# Patient Record
Sex: MALE | Race: BLACK OR AFRICAN AMERICAN | NOT HISPANIC OR LATINO | URBAN - METROPOLITAN AREA
[De-identification: names, ages, dates, MRNs, and addresses within clinical notes are randomized per-mention and may not be internally consistent; named-entity substitution may affect disease eponyms.]

---

## 2017-07-02 ENCOUNTER — EMERGENCY (EMERGENCY)
Facility: HOSPITAL | Age: 27
LOS: 1 days | Discharge: HOME | End: 2017-07-02
Admitting: EMERGENCY MEDICINE
Payer: COMMERCIAL

## 2017-07-02 VITALS
HEART RATE: 98 BPM | OXYGEN SATURATION: 96 % | SYSTOLIC BLOOD PRESSURE: 139 MMHG | HEIGHT: 70 IN | DIASTOLIC BLOOD PRESSURE: 94 MMHG | RESPIRATION RATE: 18 BRPM | TEMPERATURE: 99 F | WEIGHT: 176.37 LBS

## 2017-07-02 VITALS
DIASTOLIC BLOOD PRESSURE: 75 MMHG | RESPIRATION RATE: 18 BRPM | HEART RATE: 87 BPM | OXYGEN SATURATION: 97 % | SYSTOLIC BLOOD PRESSURE: 123 MMHG | TEMPERATURE: 98 F

## 2017-07-02 DIAGNOSIS — M54.2 CERVICALGIA: ICD-10-CM

## 2017-07-02 DIAGNOSIS — M62.838 OTHER MUSCLE SPASM: ICD-10-CM

## 2017-07-02 DIAGNOSIS — Z79.899 OTHER LONG TERM (CURRENT) DRUG THERAPY: ICD-10-CM

## 2017-07-02 PROCEDURE — 99284 EMERGENCY DEPT VISIT MOD MDM: CPT | Mod: 25

## 2017-07-02 PROCEDURE — 96372 THER/PROPH/DIAG INJ SC/IM: CPT

## 2017-07-02 PROCEDURE — 99283 EMERGENCY DEPT VISIT LOW MDM: CPT | Mod: 25

## 2017-07-02 RX ORDER — KETOROLAC TROMETHAMINE 30 MG/ML
60 SYRINGE (ML) INJECTION ONCE
Qty: 0 | Refills: 0 | Status: DISCONTINUED | OUTPATIENT
Start: 2017-07-02 | End: 2017-07-02

## 2017-07-02 RX ORDER — IBUPROFEN 200 MG
1 TABLET ORAL
Qty: 20 | Refills: 0 | OUTPATIENT
Start: 2017-07-02

## 2017-07-02 RX ORDER — DIAZEPAM 5 MG
5 TABLET ORAL ONCE
Qty: 0 | Refills: 0 | Status: DISCONTINUED | OUTPATIENT
Start: 2017-07-02 | End: 2017-07-02

## 2017-07-02 RX ORDER — DIAZEPAM 5 MG
1 TABLET ORAL
Qty: 6 | Refills: 0 | OUTPATIENT
Start: 2017-07-02

## 2017-07-02 RX ADMIN — Medication 60 MILLIGRAM(S): at 10:18

## 2017-07-02 RX ADMIN — Medication 5 MILLIGRAM(S): at 08:17

## 2017-07-02 RX ADMIN — Medication 60 MILLIGRAM(S): at 08:18

## 2017-07-02 NOTE — ED ADULT NURSE NOTE - OBJECTIVE STATEMENT
Patient to the ED for worsening neck pain over the last 2 days where he had difficulty getting out of bed this morning and is presently unable to move his neck with radiation to L shoulder and back of L leg.  Patient reported that he has a history if sciatica and takes ibuprofen at home as needed.

## 2017-07-02 NOTE — ED PROVIDER NOTE - NS ED ROS FT
CONSTITUTIONAL: No fever, chills, or weakness  NEURO: No headache, no dizziness, no syncope; No weakness/tingling/numbness  EYES: No visual changes  ENT: No rhinorrhea or sore throat  PULM: No cough or dyspnea  CV: No chest pain or palpitations  GI: No abdominal pain, vomiting, or diarrhea  : No dysuria, hematuria, frequency  MSK: Hx of chronic back pain from sciatica, at baseline. No joint pain  SKIN: no rash

## 2017-07-02 NOTE — ED PROVIDER NOTE - OBJECTIVE STATEMENT
pt c/o pain and stiffness in the back of his neck for about 3 days.  has severe pain when he tries to move neck, especially from side to side. no fever or chills, no headache, no rash.  no nasal congestion or rhinorrhea, and no throat pain or pain in ears.  there has been no history of trauma or neck manipulation.

## 2017-07-02 NOTE — ED PROVIDER NOTE - MEDICAL DECISION MAKING DETAILS
cervical muscle spasm/torticollis.  no hx of trauma.  neuro exam intact, no cervical bruit.  tx warm compresses, nsaid, muscle relaxants. cervical muscle spasm/torticollis.  no hx of trauma.  no fever/headache/rash to suggest meningitis.  neuro exam intact, no cervical bruit.  tx warm compresses, nsaid, muscle relaxants.

## 2017-07-02 NOTE — ED PROVIDER NOTE - PHYSICAL EXAMINATION
CONSTITUTIONAL: WD,WN. NAD.    SKIN: Normal color and turgor. No rash.    EYES: Conjunctiva clear. EOMI. PERRL.    ENT: Airway patent, OP clear. Nasal mucosa clear, no rhinorrhea.   RESPIRATORY:  Breathing non-labored. No retractions, accessory muscle use.  Lungs CTA bilat.  CARDIOVASCULAR:  RRR, S1S2. No M/R/G.    No neck bruit.  GI:  Abdomen soft, nontender.    MSK: Initial lateral neck rotation limited to apx 10 deg left and right with muscular spasm noted.  NEURO: Alert and oriented; CN  II-XII intact.  BROOKS with normal strength.  DTR +2 bilateral biceps, BR, patella. Normal speech and gait.

## 2018-02-01 PROBLEM — Z00.00 ENCOUNTER FOR PREVENTIVE HEALTH EXAMINATION: Status: ACTIVE | Noted: 2018-02-01

## 2018-02-05 ENCOUNTER — APPOINTMENT (OUTPATIENT)
Dept: SURGERY | Facility: CLINIC | Age: 28
End: 2018-02-05

## 2018-04-02 ENCOUNTER — APPOINTMENT (OUTPATIENT)
Dept: SURGERY | Facility: CLINIC | Age: 28
End: 2018-04-02

## 2019-02-18 ENCOUNTER — EMERGENCY (EMERGENCY)
Facility: HOSPITAL | Age: 29
LOS: 1 days | Discharge: HOME | End: 2019-02-18
Attending: EMERGENCY MEDICINE | Admitting: EMERGENCY MEDICINE
Payer: COMMERCIAL

## 2019-02-18 VITALS
TEMPERATURE: 98 F | HEART RATE: 72 BPM | DIASTOLIC BLOOD PRESSURE: 82 MMHG | OXYGEN SATURATION: 98 % | RESPIRATION RATE: 20 BRPM | SYSTOLIC BLOOD PRESSURE: 135 MMHG

## 2019-02-18 VITALS
RESPIRATION RATE: 20 BRPM | OXYGEN SATURATION: 100 % | DIASTOLIC BLOOD PRESSURE: 99 MMHG | SYSTOLIC BLOOD PRESSURE: 149 MMHG | WEIGHT: 180.78 LBS | TEMPERATURE: 99 F | HEART RATE: 86 BPM

## 2019-02-18 DIAGNOSIS — R07.89 OTHER CHEST PAIN: ICD-10-CM

## 2019-02-18 DIAGNOSIS — Z79.1 LONG TERM (CURRENT) USE OF NON-STEROIDAL ANTI-INFLAMMATORIES (NSAID): ICD-10-CM

## 2019-02-18 DIAGNOSIS — Z79.899 OTHER LONG TERM (CURRENT) DRUG THERAPY: ICD-10-CM

## 2019-02-18 DIAGNOSIS — I10 ESSENTIAL (PRIMARY) HYPERTENSION: ICD-10-CM

## 2019-02-18 DIAGNOSIS — Z87.891 PERSONAL HISTORY OF NICOTINE DEPENDENCE: ICD-10-CM

## 2019-02-18 PROBLEM — M54.30 SCIATICA, UNSPECIFIED SIDE: Chronic | Status: ACTIVE | Noted: 2017-07-02

## 2019-02-18 LAB
ALBUMIN SERPL ELPH-MCNC: 5 G/DL — SIGNIFICANT CHANGE UP (ref 3.3–5)
ALP SERPL-CCNC: 99 U/L — SIGNIFICANT CHANGE UP (ref 40–120)
ALT FLD-CCNC: 35 U/L — SIGNIFICANT CHANGE UP (ref 10–45)
ANION GAP SERPL CALC-SCNC: 16 MMOL/L — SIGNIFICANT CHANGE UP (ref 5–17)
AST SERPL-CCNC: 42 U/L — HIGH (ref 10–40)
BASOPHILS # BLD AUTO: 0.05 K/UL — SIGNIFICANT CHANGE UP (ref 0–0.2)
BASOPHILS NFR BLD AUTO: 0.8 % — SIGNIFICANT CHANGE UP (ref 0–2)
BILIRUB SERPL-MCNC: 1.2 MG/DL — SIGNIFICANT CHANGE UP (ref 0.2–1.2)
BUN SERPL-MCNC: 9 MG/DL — SIGNIFICANT CHANGE UP (ref 7–23)
CALCIUM SERPL-MCNC: 10.4 MG/DL — SIGNIFICANT CHANGE UP (ref 8.4–10.5)
CHLORIDE SERPL-SCNC: 93 MMOL/L — LOW (ref 96–108)
CO2 SERPL-SCNC: 25 MMOL/L — SIGNIFICANT CHANGE UP (ref 22–31)
CREAT SERPL-MCNC: 0.88 MG/DL — SIGNIFICANT CHANGE UP (ref 0.5–1.3)
D DIMER BLD IA.RAPID-MCNC: <150 NG/ML DDU — SIGNIFICANT CHANGE UP
EOSINOPHIL # BLD AUTO: 0.16 K/UL — SIGNIFICANT CHANGE UP (ref 0–0.5)
EOSINOPHIL NFR BLD AUTO: 2.5 % — SIGNIFICANT CHANGE UP (ref 0–6)
GLUCOSE SERPL-MCNC: 112 MG/DL — HIGH (ref 70–99)
HCT VFR BLD CALC: 47.6 % — SIGNIFICANT CHANGE UP (ref 39–50)
HGB BLD-MCNC: 16.4 G/DL — SIGNIFICANT CHANGE UP (ref 13–17)
IMM GRANULOCYTES NFR BLD AUTO: 0.2 % — SIGNIFICANT CHANGE UP (ref 0–1.5)
LIDOCAIN IGE QN: 29 U/L — SIGNIFICANT CHANGE UP (ref 7–60)
LYMPHOCYTES # BLD AUTO: 2.03 K/UL — SIGNIFICANT CHANGE UP (ref 1–3.3)
LYMPHOCYTES # BLD AUTO: 31.4 % — SIGNIFICANT CHANGE UP (ref 13–44)
MAGNESIUM SERPL-MCNC: 2 MG/DL — SIGNIFICANT CHANGE UP (ref 1.6–2.6)
MCHC RBC-ENTMCNC: 30.3 PG — SIGNIFICANT CHANGE UP (ref 27–34)
MCHC RBC-ENTMCNC: 34.5 GM/DL — SIGNIFICANT CHANGE UP (ref 32–36)
MCV RBC AUTO: 87.8 FL — SIGNIFICANT CHANGE UP (ref 80–100)
MONOCYTES # BLD AUTO: 0.64 K/UL — SIGNIFICANT CHANGE UP (ref 0–0.9)
MONOCYTES NFR BLD AUTO: 9.9 % — SIGNIFICANT CHANGE UP (ref 2–14)
NEUTROPHILS # BLD AUTO: 3.58 K/UL — SIGNIFICANT CHANGE UP (ref 1.8–7.4)
NEUTROPHILS NFR BLD AUTO: 55.2 % — SIGNIFICANT CHANGE UP (ref 43–77)
NRBC # BLD: 0 /100 WBCS — SIGNIFICANT CHANGE UP (ref 0–0)
PLATELET # BLD AUTO: 268 K/UL — SIGNIFICANT CHANGE UP (ref 150–400)
POTASSIUM SERPL-MCNC: 3.9 MMOL/L — SIGNIFICANT CHANGE UP (ref 3.5–5.3)
POTASSIUM SERPL-SCNC: 3.9 MMOL/L — SIGNIFICANT CHANGE UP (ref 3.5–5.3)
PROT SERPL-MCNC: 9.1 G/DL — HIGH (ref 6–8.3)
RBC # BLD: 5.42 M/UL — SIGNIFICANT CHANGE UP (ref 4.2–5.8)
RBC # FLD: 11.9 % — SIGNIFICANT CHANGE UP (ref 10.3–14.5)
SODIUM SERPL-SCNC: 134 MMOL/L — LOW (ref 135–145)
TROPONIN T SERPL-MCNC: <0.01 NG/ML — SIGNIFICANT CHANGE UP (ref 0–0.01)
TROPONIN T SERPL-MCNC: <0.01 NG/ML — SIGNIFICANT CHANGE UP (ref 0–0.01)
WBC # BLD: 6.47 K/UL — SIGNIFICANT CHANGE UP (ref 3.8–10.5)
WBC # FLD AUTO: 6.47 K/UL — SIGNIFICANT CHANGE UP (ref 3.8–10.5)

## 2019-02-18 PROCEDURE — 85025 COMPLETE CBC W/AUTO DIFF WBC: CPT

## 2019-02-18 PROCEDURE — 36415 COLL VENOUS BLD VENIPUNCTURE: CPT

## 2019-02-18 PROCEDURE — 80053 COMPREHEN METABOLIC PANEL: CPT

## 2019-02-18 PROCEDURE — 99284 EMERGENCY DEPT VISIT MOD MDM: CPT | Mod: 25

## 2019-02-18 PROCEDURE — 83735 ASSAY OF MAGNESIUM: CPT

## 2019-02-18 PROCEDURE — 84484 ASSAY OF TROPONIN QUANT: CPT

## 2019-02-18 PROCEDURE — 93005 ELECTROCARDIOGRAM TRACING: CPT

## 2019-02-18 PROCEDURE — 85379 FIBRIN DEGRADATION QUANT: CPT

## 2019-02-18 PROCEDURE — 71046 X-RAY EXAM CHEST 2 VIEWS: CPT

## 2019-02-18 PROCEDURE — 96360 HYDRATION IV INFUSION INIT: CPT

## 2019-02-18 PROCEDURE — 71046 X-RAY EXAM CHEST 2 VIEWS: CPT | Mod: 26

## 2019-02-18 PROCEDURE — 94640 AIRWAY INHALATION TREATMENT: CPT

## 2019-02-18 PROCEDURE — 83690 ASSAY OF LIPASE: CPT

## 2019-02-18 PROCEDURE — 99285 EMERGENCY DEPT VISIT HI MDM: CPT | Mod: 25

## 2019-02-18 PROCEDURE — 93010 ELECTROCARDIOGRAM REPORT: CPT

## 2019-02-18 RX ORDER — ASPIRIN/CALCIUM CARB/MAGNESIUM 324 MG
325 TABLET ORAL ONCE
Qty: 0 | Refills: 0 | Status: COMPLETED | OUTPATIENT
Start: 2019-02-18 | End: 2019-02-18

## 2019-02-18 RX ORDER — SODIUM CHLORIDE 9 MG/ML
1000 INJECTION INTRAMUSCULAR; INTRAVENOUS; SUBCUTANEOUS ONCE
Qty: 0 | Refills: 0 | Status: COMPLETED | OUTPATIENT
Start: 2019-02-18 | End: 2019-02-18

## 2019-02-18 RX ORDER — ASPIRIN/CALCIUM CARB/MAGNESIUM 324 MG
325 TABLET ORAL DAILY
Qty: 0 | Refills: 0 | Status: DISCONTINUED | OUTPATIENT
Start: 2019-02-18 | End: 2019-02-18

## 2019-02-18 RX ORDER — ALPRAZOLAM 0.25 MG
0.25 TABLET ORAL ONCE
Qty: 0 | Refills: 0 | Status: DISCONTINUED | OUTPATIENT
Start: 2019-02-18 | End: 2019-02-18

## 2019-02-18 RX ORDER — IBUPROFEN 200 MG
1 TABLET ORAL
Qty: 0 | Refills: 0 | COMMUNITY

## 2019-02-18 RX ORDER — IPRATROPIUM/ALBUTEROL SULFATE 18-103MCG
3 AEROSOL WITH ADAPTER (GRAM) INHALATION ONCE
Qty: 0 | Refills: 0 | Status: COMPLETED | OUTPATIENT
Start: 2019-02-18 | End: 2019-02-18

## 2019-02-18 RX ORDER — AMLODIPINE BESYLATE 2.5 MG/1
5 TABLET ORAL ONCE
Qty: 0 | Refills: 0 | Status: COMPLETED | OUTPATIENT
Start: 2019-02-18 | End: 2019-02-18

## 2019-02-18 RX ADMIN — Medication 3 MILLILITER(S): at 11:01

## 2019-02-18 RX ADMIN — SODIUM CHLORIDE 2000 MILLILITER(S): 9 INJECTION INTRAMUSCULAR; INTRAVENOUS; SUBCUTANEOUS at 11:01

## 2019-02-18 RX ADMIN — SODIUM CHLORIDE 1000 MILLILITER(S): 9 INJECTION INTRAMUSCULAR; INTRAVENOUS; SUBCUTANEOUS at 12:01

## 2019-02-18 RX ADMIN — Medication 325 MILLIGRAM(S): at 11:01

## 2019-02-18 RX ADMIN — AMLODIPINE BESYLATE 5 MILLIGRAM(S): 2.5 TABLET ORAL at 11:01

## 2019-02-18 RX ADMIN — Medication 0.25 MILLIGRAM(S): at 12:51

## 2019-02-18 NOTE — ED ADULT NURSE NOTE - OBJECTIVE STATEMENT
27 y/o male w/ hx of HTN, on Amlodipine 2.5, presents to the ED c/o left sided chest pain/tightness that began at 4 AM associated w/ feeling of anxiety. Denies fever, chills, cough, SOB, abdominal pain, back pain, nausea, vomiting, and any other associated sx.

## 2019-02-18 NOTE — ED ADULT NURSE NOTE - OTHER COMPLAINTS
hx of HTN. reports left sided chest tightness since this morning. sent by city MD. family hx of heart disease

## 2019-02-18 NOTE — ED PROVIDER NOTE - DIAGNOSTIC INTERPRETATION
CXR wet read: The heart appears to be within normal limits in transverse diameter.  No acute infiltrates, effusions or evidence of pulmonary vascular congestion.  The chest wall and surrounding bony structures appear normal.

## 2019-02-18 NOTE — ED PROVIDER NOTE - OBJECTIVE STATEMENT
chest tightness since 4 am this morning  chest pain  Additional Complaints  hx of HTN. reports left sided chest tightness since this morning. sent by city MD. family hx of heart disease 29 yo M with PMH of sciatica (OTC pain meds prn pain), HTN, on Amlodipine, otherwise healthy "at 4 am I was about to go to sleep and I had a bad feeling come over me...like a bad anxiety attack and then I started to have chest tightness" Admits to some SOB at the onset of the episode, with no SOB currently. Chest tightness continued and is still present now (X 6 hours) and pt went to and Urgent Care center and was sent to ED for evaluation. No fevers, chills, cough. Pt states that his father  of a "cardiac arrest" at age 31 but ?asthma and resp arrest at etiology of his death as pt denies that he had any cardiac history. No other early FH of cardiac ds. States that he has had "anxiety attacks" in the past "years ago"- no chronic meds for anxiety, "but never had it this bad". Also states that he has been more stressed than usual for "personal reasons", but denies any SI/HI/ hallucinations. No illicit substance use.

## 2019-02-18 NOTE — ED PROVIDER NOTE - CARE PROVIDER_API CALL
Salma Pinto)  Internal Medicine  [address] [id number]  Phone: [phone]  Fax: [phone]  Follow Up Time:     Corbin Almodovar)  Medicine  [address] [place] [id number]  Phone: [phone]  Fax: [phone]  Follow Up Time:

## 2019-02-18 NOTE — ED PROVIDER NOTE - NSFOLLOWUPCLINICS_GEN_ALL_ED_FT
[facility] [address] [place] [address]  Family Medicine  [address] [place] [address], [facility]  Phone: [phone]  Fax: [phone]  Follow Up Time: 4-6 Days

## 2019-02-18 NOTE — ED PROVIDER NOTE - CLINICAL SUMMARY MEDICAL DECISION MAKING FREE TEXT BOX
29 yo M with PMH of sciatica (OTC pain meds prn pain), HTN, on Amlodipine, otherwise healthy "at 4 am I was about to go to sleep and I had a bad feeling come over me...like a bad anxiety attack and then I started to have chest tightness" Admits to some SOB at the onset of the episode, with no SOB currently. Chest tightness continued and is still present now (X 6 hours) and pt went to and Urgent Care center and was sent to ED for evaluation. No fevers, chills, cough. Pt with normal exam. EKG, CXR with NAD and trop is negative. Suspect sxs likely sec to anxiety. Pt reassured and to f/up outpt.

## 2019-02-18 NOTE — ED PROVIDER NOTE - CARE PROVIDERS DIRECT ADDRESSES
[email or web address]

## 2019-02-18 NOTE — ED ADULT TRIAGE NOTE - OTHER COMPLAINTS
hx of HTN. reports left sided chest tightness since this morning. sent by city MD hx of HTN. reports left sided chest tightness since this morning. sent by city MD. family hx of heart disease

## 2019-02-18 NOTE — ED ADULT NURSE NOTE - NSIMPLEMENTINTERV_GEN_ALL_ED
Implemented All Universal Safety Interventions:  [place] to call system. Call bell, personal items and telephone within reach. Instruct patient to call for assistance. Room bathroom lighting operational. Non-slip footwear when patient is off stretcher. Physically safe environment: no spills, clutter or unnecessary equipment. Stretcher in lowest position, wheels locked, appropriate side rails in place.

## 2019-02-27 ENCOUNTER — APPOINTMENT (OUTPATIENT)
Dept: HEART AND VASCULAR | Facility: CLINIC | Age: 29
End: 2019-02-27
Payer: MEDICAID

## 2019-02-27 ENCOUNTER — INBOUND DOCUMENT (OUTPATIENT)
Age: 29
End: 2019-02-27

## 2019-02-27 VITALS
WEIGHT: 178.99 LBS | TEMPERATURE: 98.9 F | OXYGEN SATURATION: 98 % | HEART RATE: 93 BPM | DIASTOLIC BLOOD PRESSURE: 80 MMHG | BODY MASS INDEX: 27.13 KG/M2 | SYSTOLIC BLOOD PRESSURE: 124 MMHG | HEIGHT: 68.11 IN

## 2019-02-27 DIAGNOSIS — Z82.49 FAMILY HISTORY OF ISCHEMIC HEART DISEASE AND OTHER DISEASES OF THE CIRCULATORY SYSTEM: ICD-10-CM

## 2019-02-27 DIAGNOSIS — Z87.891 PERSONAL HISTORY OF NICOTINE DEPENDENCE: ICD-10-CM

## 2019-02-27 DIAGNOSIS — Z78.9 OTHER SPECIFIED HEALTH STATUS: ICD-10-CM

## 2019-02-27 DIAGNOSIS — R07.9 CHEST PAIN, UNSPECIFIED: ICD-10-CM

## 2019-02-27 PROCEDURE — 99204 OFFICE O/P NEW MOD 45 MIN: CPT | Mod: 25

## 2019-02-27 PROCEDURE — 93000 ELECTROCARDIOGRAM COMPLETE: CPT

## 2019-02-27 PROCEDURE — 99401 PREV MED CNSL INDIV APPRX 15: CPT | Mod: 25

## 2019-02-27 RX ORDER — AZITHROMYCIN 500 MG/1
500 TABLET, FILM COATED ORAL
Qty: 2 | Refills: 0 | Status: COMPLETED | COMMUNITY
Start: 2019-01-10 | End: 2019-02-27

## 2019-02-27 RX ORDER — AMLODIPINE BESYLATE 2.5 MG/1
2.5 TABLET ORAL
Qty: 90 | Refills: 0 | Status: COMPLETED | COMMUNITY
Start: 2019-01-10 | End: 2019-02-27

## 2019-02-27 RX ORDER — HYDROXYZINE PAMOATE 50 MG/1
50 CAPSULE ORAL
Qty: 60 | Refills: 0 | Status: COMPLETED | COMMUNITY
Start: 2019-02-19 | End: 2019-02-27

## 2019-02-27 RX ORDER — AMLODIPINE BESYLATE 5 MG/1
5 TABLET ORAL
Qty: 90 | Refills: 0 | Status: ACTIVE | COMMUNITY
Start: 2019-02-19

## 2019-02-27 RX ORDER — AMOXICILLIN 500 MG/1
500 CAPSULE ORAL
Qty: 21 | Refills: 0 | Status: COMPLETED | COMMUNITY
Start: 2018-11-16 | End: 2019-02-27

## 2019-02-27 NOTE — HISTORY OF PRESENT ILLNESS
[FreeTextEntry1] : 28 AA M with HTN on norvasc 5mg with chonic lbp 2/2 sciatica, anxiety, asthma here for chest pain went to urgent care and er, with associated palp no syncope\par USOH, 100% compliant with meds\par location: chest\par duration: minutes\par  modifying factors rest\par timing: exertional\par severity: 6/10\par EKG nsr lvh\par \par fhx father SCD age 31\par \par sochx former smoker

## 2019-02-27 NOTE — PHYSICAL EXAM
[General Appearance - Well Developed] : well developed [Normal Appearance] : normal appearance [Well Groomed] : well groomed [General Appearance - Well Nourished] : well nourished [No Deformities] : no deformities [General Appearance - In No Acute Distress] : no acute distress [Normal Conjunctiva] : the conjunctiva exhibited no abnormalities [Eyelids - No Xanthelasma] : the eyelids demonstrated no xanthelasmas [Normal Oral Mucosa] : normal oral mucosa [No Oral Pallor] : no oral pallor [No Oral Cyanosis] : no oral cyanosis [Normal Jugular Venous A Waves Present] : normal jugular venous A waves present [Normal Jugular Venous V Waves Present] : normal jugular venous V waves present [No Jugular Venous Silverman A Waves] : no jugular venous silverman A waves [Heart Rate And Rhythm] : heart rate and rhythm were normal [Heart Sounds] : normal S1 and S2 [Murmurs] : no murmurs present [Respiration, Rhythm And Depth] : normal respiratory rhythm and effort [Exaggerated Use Of Accessory Muscles For Inspiration] : no accessory muscle use [Auscultation Breath Sounds / Voice Sounds] : lungs were clear to auscultation bilaterally [Abdomen Soft] : soft [Abdomen Tenderness] : non-tender [Abdomen Mass (___ Cm)] : no abdominal mass palpated [Abnormal Walk] : normal gait [Gait - Sufficient For Exercise Testing] : the gait was sufficient for exercise testing [Nail Clubbing] : no clubbing of the fingernails [Cyanosis, Localized] : no localized cyanosis [Petechial Hemorrhages (___cm)] : no petechial hemorrhages [Skin Color & Pigmentation] : normal skin color and pigmentation [] : no rash [No Venous Stasis] : no venous stasis [Skin Lesions] : no skin lesions [No Skin Ulcers] : no skin ulcer [No Xanthoma] : no  xanthoma was observed [Oriented To Time, Place, And Person] : oriented to person, place, and time [Affect] : the affect was normal [Mood] : the mood was normal [No Anxiety] : not feeling anxious

## 2019-02-27 NOTE — DISCUSSION/SUMMARY
[FreeTextEntry1] : The number of diagnostic and/or management options \par \par HTN - pain component versus essential htn - ambi bp monitor and 2d echo to eval central pressures and dd\par \par FHx CAD premature - father with scd, will get CTA cor to r/o anomolous cor arteries  given chest pain\par \par \par Labs, radiology: ekg\par \par Aspirin therapy: no\par \par LDL: n/a\par \par \par Overall Risk: High Complexity\par \par \par Additional detailed discussion regarding prevention including but not limiting to goal SBP<130mmHg, Mediterranean diet discussion, No salt diet, diabetes screening, and goal LDL<100. Smoking cessation, EtOH use and depression screen where applicable\par Exercise counseling and plan discussed with patient\par will readdress and reinforce prevention in subsequent visits

## 2019-03-17 ENCOUNTER — INPATIENT (INPATIENT)
Facility: HOSPITAL | Age: 29
LOS: 1 days | Discharge: HOME | DRG: 274 | End: 2019-03-19
Attending: INTERNAL MEDICINE | Admitting: INTERNAL MEDICINE
Payer: COMMERCIAL

## 2019-03-17 VITALS
TEMPERATURE: 98 F | SYSTOLIC BLOOD PRESSURE: 155 MMHG | DIASTOLIC BLOOD PRESSURE: 82 MMHG | RESPIRATION RATE: 20 BRPM | HEART RATE: 130 BPM

## 2019-03-17 LAB
ALBUMIN SERPL ELPH-MCNC: 4.8 G/DL — SIGNIFICANT CHANGE UP (ref 3.3–5)
ALP SERPL-CCNC: 96 U/L — SIGNIFICANT CHANGE UP (ref 40–120)
ALT FLD-CCNC: 34 U/L — SIGNIFICANT CHANGE UP (ref 10–45)
ANION GAP SERPL CALC-SCNC: 17 MMOL/L — SIGNIFICANT CHANGE UP (ref 5–17)
APTT BLD: 30.7 SEC — SIGNIFICANT CHANGE UP (ref 27.5–36.3)
AST SERPL-CCNC: 45 U/L — HIGH (ref 10–40)
BASOPHILS # BLD AUTO: 0.04 K/UL — SIGNIFICANT CHANGE UP (ref 0–0.2)
BASOPHILS NFR BLD AUTO: 0.5 % — SIGNIFICANT CHANGE UP (ref 0–2)
BILIRUB SERPL-MCNC: 0.8 MG/DL — SIGNIFICANT CHANGE UP (ref 0.2–1.2)
BUN SERPL-MCNC: 12 MG/DL — SIGNIFICANT CHANGE UP (ref 7–23)
CALCIUM SERPL-MCNC: 10.3 MG/DL — SIGNIFICANT CHANGE UP (ref 8.4–10.5)
CHLORIDE SERPL-SCNC: 99 MMOL/L — SIGNIFICANT CHANGE UP (ref 96–108)
CK MB CFR SERPL CALC: 1.6 NG/ML — SIGNIFICANT CHANGE UP (ref 0–6.7)
CO2 SERPL-SCNC: 21 MMOL/L — LOW (ref 22–31)
CREAT SERPL-MCNC: 0.88 MG/DL — SIGNIFICANT CHANGE UP (ref 0.5–1.3)
EOSINOPHIL # BLD AUTO: 0.04 K/UL — SIGNIFICANT CHANGE UP (ref 0–0.5)
EOSINOPHIL NFR BLD AUTO: 0.5 % — SIGNIFICANT CHANGE UP (ref 0–6)
GLUCOSE SERPL-MCNC: 130 MG/DL — HIGH (ref 70–99)
HCT VFR BLD CALC: 47.6 % — SIGNIFICANT CHANGE UP (ref 39–50)
HGB BLD-MCNC: 16.5 G/DL — SIGNIFICANT CHANGE UP (ref 13–17)
IMM GRANULOCYTES NFR BLD AUTO: 0.2 % — SIGNIFICANT CHANGE UP (ref 0–1.5)
INR BLD: 1.18 — HIGH (ref 0.88–1.16)
LYMPHOCYTES # BLD AUTO: 1.32 K/UL — SIGNIFICANT CHANGE UP (ref 1–3.3)
LYMPHOCYTES # BLD AUTO: 16.4 % — SIGNIFICANT CHANGE UP (ref 13–44)
MCHC RBC-ENTMCNC: 30.3 PG — SIGNIFICANT CHANGE UP (ref 27–34)
MCHC RBC-ENTMCNC: 34.7 GM/DL — SIGNIFICANT CHANGE UP (ref 32–36)
MCV RBC AUTO: 87.3 FL — SIGNIFICANT CHANGE UP (ref 80–100)
MONOCYTES # BLD AUTO: 0.51 K/UL — SIGNIFICANT CHANGE UP (ref 0–0.9)
MONOCYTES NFR BLD AUTO: 6.3 % — SIGNIFICANT CHANGE UP (ref 2–14)
NEUTROPHILS # BLD AUTO: 6.14 K/UL — SIGNIFICANT CHANGE UP (ref 1.8–7.4)
NEUTROPHILS NFR BLD AUTO: 76.1 % — SIGNIFICANT CHANGE UP (ref 43–77)
NRBC # BLD: 0 /100 WBCS — SIGNIFICANT CHANGE UP (ref 0–0)
PLATELET # BLD AUTO: 327 K/UL — SIGNIFICANT CHANGE UP (ref 150–400)
POTASSIUM SERPL-MCNC: 4.1 MMOL/L — SIGNIFICANT CHANGE UP (ref 3.5–5.3)
POTASSIUM SERPL-SCNC: 4.1 MMOL/L — SIGNIFICANT CHANGE UP (ref 3.5–5.3)
PROT SERPL-MCNC: 9.4 G/DL — HIGH (ref 6–8.3)
PROTHROM AB SERPL-ACNC: 13.4 SEC — HIGH (ref 10–12.9)
RBC # BLD: 5.45 M/UL — SIGNIFICANT CHANGE UP (ref 4.2–5.8)
RBC # FLD: 12 % — SIGNIFICANT CHANGE UP (ref 10.3–14.5)
SODIUM SERPL-SCNC: 137 MMOL/L — SIGNIFICANT CHANGE UP (ref 135–145)
TROPONIN T SERPL-MCNC: <0.01 NG/ML — SIGNIFICANT CHANGE UP (ref 0–0.01)
WBC # BLD: 8.07 K/UL — SIGNIFICANT CHANGE UP (ref 3.8–10.5)
WBC # FLD AUTO: 8.07 K/UL — SIGNIFICANT CHANGE UP (ref 3.8–10.5)

## 2019-03-17 PROCEDURE — 71046 X-RAY EXAM CHEST 2 VIEWS: CPT | Mod: 26

## 2019-03-17 PROCEDURE — 99291 CRITICAL CARE FIRST HOUR: CPT

## 2019-03-17 RX ORDER — ADENOSINE 3 MG/ML
6 INJECTION INTRAVENOUS ONCE
Qty: 0 | Refills: 0 | Status: COMPLETED | OUTPATIENT
Start: 2019-03-17 | End: 2019-03-17

## 2019-03-17 RX ORDER — SODIUM CHLORIDE 9 MG/ML
1000 INJECTION INTRAMUSCULAR; INTRAVENOUS; SUBCUTANEOUS ONCE
Qty: 0 | Refills: 0 | Status: COMPLETED | OUTPATIENT
Start: 2019-03-17 | End: 2019-03-17

## 2019-03-17 RX ADMIN — Medication 0.5 MILLIGRAM(S): at 21:25

## 2019-03-17 RX ADMIN — ADENOSINE 6 MILLIGRAM(S): 3 INJECTION INTRAVENOUS at 20:35

## 2019-03-17 RX ADMIN — Medication 1 MILLIGRAM(S): at 19:55

## 2019-03-17 RX ADMIN — SODIUM CHLORIDE 2000 MILLILITER(S): 9 INJECTION INTRAMUSCULAR; INTRAVENOUS; SUBCUTANEOUS at 19:55

## 2019-03-17 RX ADMIN — SODIUM CHLORIDE 2000 MILLILITER(S): 9 INJECTION INTRAMUSCULAR; INTRAVENOUS; SUBCUTANEOUS at 20:45

## 2019-03-17 NOTE — ED ADULT NURSE NOTE - OBJECTIVE STATEMENT
29 y/o male presents to ED with tachycardia and anxiety. AOx3, states began feeling chest tightness and "heart racing, feels SoB". Per mother, had severe anxiety exacerbation one month ago but today is worse. Notes feels like heart stopped, intermittent tingling in bilateral UE. Appears anxious in chair, denies abdominal pain, fevers, chills, dizziness, weakness. EKG done, peripheral IV placed, labs sent. 29 y/o male presents to ED with tachycardia and anxiety. AOx3, states began feeling chest tightness and "heart racing, feels SoB". Per mother, had severe anxiety exacerbation one month ago but today is worse. Hx HTN, anxiety, and sciatica. Notes feels like heart stopped, intermittent tingling in bilateral UE. Appears anxious in chair, denies abdominal pain, fevers, chills, dizziness, weakness. EKG done, peripheral IV placed, labs sent.

## 2019-03-17 NOTE — ED PROVIDER NOTE - ATTENDING CONTRIBUTION TO CARE
27 yo M with pmh of HTN, sciatica and ?anxiety c/o chest tightness and sob. Noted to be tachycardic to 130s, EKG done and reviewed, sinus tachycardia. pt placed on monitor, then went into HR of 200-210s, EKG w/ SVT. Both KIMBERLY Martinez and I evaluated pt at this time, attempted carotid massage, then modified valsalva without improvement. After adenosine 6mg, came back down to 130s, next EKG again in sinus tachycardia. Pt anxious appearing (appropriate for HR!), diaphoretic, tremulous. No peripheral edema on exam. case d/w cardiology fellow, to admit for further monitoring, workup, and management

## 2019-03-17 NOTE — ED PROVIDER NOTE - PROGRESS NOTE DETAILS
pt noted to be in SVT on monitor. Vagal maneuvers attempted with no improvement. Pt given 6mg adenosine IVP and rate dropped to 125-130 sinus tachy. Spoke with cards fellow, no need for oral meds at this time, will admit to cards

## 2019-03-17 NOTE — ED PROVIDER NOTE - PHYSICAL EXAMINATION
CONSTITUTIONAL: pt anxious appearing, shaking   HEAD: Normocephalic; atraumatic.   EYES: PERRL; EOM intact; conjunctiva and sclera clear  ENT: normal nose; no rhinorrhea; normal pharynx with no erythema or lesions.   NECK: Supple; non-tender; no LAD  CARDIOVASCULAR: Normal S1, S2; no murmurs, rubs, or gallops. tachycardic   RESPIRATORY: Breathing easily; breath sounds clear and equal bilaterally; no wheezes, rhonchi, or rales.  GI: Soft; non-distended; non-tender; no palpable organomegaly.   MSK: FROM at all extremities, normal tone   EXT: No cyanosis or edema; N/V intact  SKIN: Normal for age and race; warm; dry; good turgor; no apparent lesions or rash.   NEURO: A & O x 3; face symmetric; grossly unremarkable.   PSYCHOLOGICAL: The patient’s mood and manner are appropriate.

## 2019-03-17 NOTE — ED PROVIDER NOTE - DIAGNOSTIC INTERPRETATION
ER PA: Penny Martinez, PAC  CHEST XRAY INTERPRETATION: lungs clear, heart shadow normal, bony structures intact

## 2019-03-17 NOTE — ED PROVIDER NOTE - CLINICAL SUMMARY MEDICAL DECISION MAKING FREE TEXT BOX
27 yo M with pmh of HTN, sciatica and ?anxiety c/o chest tightness and sob. Recent neg w/u 1 month ago. Pt tachy at 130, afebrile. Anxious appearing. Cardio tachy. Lungs cta b/l. NO LE swelling. EKG sinus  tachy at 131. 29 yo M with pmh of HTN, sciatica and ?anxiety c/o chest tightness and sob. Recent neg w/u 1 month ago. Pt tachy at 130, afebrile. Anxious appearing. Cardio tachy. Lungs cta b/l. NO LE swelling. EKG sinus  tachy at 131. r/o infx r/o acs r/o dehydration less likely PE given no risk factors and recent neg ddimer. ?anxiety if all else ruled out

## 2019-03-17 NOTE — ED PROVIDER NOTE - OBJECTIVE STATEMENT
29 yo M with pmh of HTN, sciatica and ?anxiety c/o chest tightness and sob. Pt states he was watching tv about 1 hour ago when he started to feel tightness in his chest, sob and felt like his heart stopped. Pt states he felt like he was having a heart attack. Associated with tingling in hands and feet. Pt states he has been feeling anxious but has never had symptoms like this before. Pt seen in the ED in Feb for CP but at that time it was not as severe. Pt had normal labs including trop and ddimer at that time. Pt follow up with his pmd who gave him amlodipine for his BP and hydroxyzine as needed for anxiety. Pt denies fever, chills ,cough, palpitations, sweats, dizziness, n/v, recent travel, LE swelling. Pt quit smoking over 1 year ago. Denies FH of CAD or VTE. 29 yo M with pmh of HTN, sciatica and ?anxiety c/o chest tightness and sob. Pt states he was watching tv about 1 hour ago when he started to feel tightness in his chest, sob and felt like his heart stopped. Pt states he felt like he was having a heart attack. Associated with tingling in hands and feet. Pt states he has been feeling anxious but has never had symptoms like this before. Pt seen in the ED in Feb for CP but at that time it was not as severe. Pt had normal labs including trop and ddimer at that time. Pt follow up with his pmd who gave him amlodipine for his BP and hydroxyzine as needed for anxiety. Pt denies fever, chills ,cough, palpitations, sweats, dizziness, n/v, recent travel, LE swelling. Pt quit smoking over 1 year ago. Denies FH of CAD or VTE. Denies drug/ etoh use. Denies supplement use.

## 2019-03-17 NOTE — ED ADULT NURSE NOTE - NSIMPLEMENTINTERV_GEN_ALL_ED
Implemented All Universal Safety Interventions:  [place] to call system. Call bell, personal items and telephone within reach. Instruct patient to call for assistance. Room bathroom lighting operational. Non-slip footwear when patient is off stretcher. Physically safe environment: no spills, clutter or unnecessary equipment. Stretcher in lowest position, wheels locked, appropriate side rails in place.

## 2019-03-18 ENCOUNTER — TRANSCRIPTION ENCOUNTER (OUTPATIENT)
Age: 29
End: 2019-03-18

## 2019-03-18 DIAGNOSIS — I10 ESSENTIAL (PRIMARY) HYPERTENSION: ICD-10-CM

## 2019-03-18 DIAGNOSIS — I47.1 SUPRAVENTRICULAR TACHYCARDIA: ICD-10-CM

## 2019-03-18 LAB
ALBUMIN SERPL ELPH-MCNC: 4.1 G/DL — SIGNIFICANT CHANGE UP (ref 3.3–5)
ALP SERPL-CCNC: 81 U/L — SIGNIFICANT CHANGE UP (ref 40–120)
ALT FLD-CCNC: 26 U/L — SIGNIFICANT CHANGE UP (ref 10–45)
ANION GAP SERPL CALC-SCNC: 15 MMOL/L — SIGNIFICANT CHANGE UP (ref 5–17)
APTT BLD: 34 SEC — SIGNIFICANT CHANGE UP (ref 27.5–36.3)
AST SERPL-CCNC: 30 U/L — SIGNIFICANT CHANGE UP (ref 10–40)
BASOPHILS # BLD AUTO: 0.04 K/UL — SIGNIFICANT CHANGE UP (ref 0–0.2)
BASOPHILS NFR BLD AUTO: 0.6 % — SIGNIFICANT CHANGE UP (ref 0–2)
BILIRUB SERPL-MCNC: 1 MG/DL — SIGNIFICANT CHANGE UP (ref 0.2–1.2)
BUN SERPL-MCNC: 9 MG/DL — SIGNIFICANT CHANGE UP (ref 7–23)
CALCIUM SERPL-MCNC: 9.4 MG/DL — SIGNIFICANT CHANGE UP (ref 8.4–10.5)
CHLORIDE SERPL-SCNC: 102 MMOL/L — SIGNIFICANT CHANGE UP (ref 96–108)
CK MB CFR SERPL CALC: 1.9 NG/ML — SIGNIFICANT CHANGE UP (ref 0–6.7)
CK SERPL-CCNC: 246 U/L — HIGH (ref 30–200)
CO2 SERPL-SCNC: 23 MMOL/L — SIGNIFICANT CHANGE UP (ref 22–31)
CREAT SERPL-MCNC: 0.88 MG/DL — SIGNIFICANT CHANGE UP (ref 0.5–1.3)
CRP SERPL-MCNC: 1.09 MG/DL — HIGH (ref 0–0.4)
EOSINOPHIL # BLD AUTO: 0.16 K/UL — SIGNIFICANT CHANGE UP (ref 0–0.5)
EOSINOPHIL NFR BLD AUTO: 2.4 % — SIGNIFICANT CHANGE UP (ref 0–6)
ERYTHROCYTE [SEDIMENTATION RATE] IN BLOOD: 23 MM/HR — HIGH
GLUCOSE SERPL-MCNC: 79 MG/DL — SIGNIFICANT CHANGE UP (ref 70–99)
HCT VFR BLD CALC: 46.7 % — SIGNIFICANT CHANGE UP (ref 39–50)
HGB BLD-MCNC: 15.8 G/DL — SIGNIFICANT CHANGE UP (ref 13–17)
IMM GRANULOCYTES NFR BLD AUTO: 0 % — SIGNIFICANT CHANGE UP (ref 0–1.5)
INR BLD: 1.15 — SIGNIFICANT CHANGE UP (ref 0.88–1.16)
LYMPHOCYTES # BLD AUTO: 2.65 K/UL — SIGNIFICANT CHANGE UP (ref 1–3.3)
LYMPHOCYTES # BLD AUTO: 39.2 % — SIGNIFICANT CHANGE UP (ref 13–44)
MAGNESIUM SERPL-MCNC: 2 MG/DL — SIGNIFICANT CHANGE UP (ref 1.6–2.6)
MCHC RBC-ENTMCNC: 30.3 PG — SIGNIFICANT CHANGE UP (ref 27–34)
MCHC RBC-ENTMCNC: 33.8 GM/DL — SIGNIFICANT CHANGE UP (ref 32–36)
MCV RBC AUTO: 89.6 FL — SIGNIFICANT CHANGE UP (ref 80–100)
MONOCYTES # BLD AUTO: 0.57 K/UL — SIGNIFICANT CHANGE UP (ref 0–0.9)
MONOCYTES NFR BLD AUTO: 8.4 % — SIGNIFICANT CHANGE UP (ref 2–14)
NEUTROPHILS # BLD AUTO: 3.34 K/UL — SIGNIFICANT CHANGE UP (ref 1.8–7.4)
NEUTROPHILS NFR BLD AUTO: 49.4 % — SIGNIFICANT CHANGE UP (ref 43–77)
NRBC # BLD: 0 /100 WBCS — SIGNIFICANT CHANGE UP (ref 0–0)
NT-PROBNP SERPL-SCNC: 122 PG/ML — SIGNIFICANT CHANGE UP (ref 0–300)
PLATELET # BLD AUTO: 294 K/UL — SIGNIFICANT CHANGE UP (ref 150–400)
POTASSIUM SERPL-MCNC: 3.7 MMOL/L — SIGNIFICANT CHANGE UP (ref 3.5–5.3)
POTASSIUM SERPL-SCNC: 3.7 MMOL/L — SIGNIFICANT CHANGE UP (ref 3.5–5.3)
PROT SERPL-MCNC: 8 G/DL — SIGNIFICANT CHANGE UP (ref 6–8.3)
PROTHROM AB SERPL-ACNC: 13.1 SEC — HIGH (ref 10–12.9)
RBC # BLD: 5.21 M/UL — SIGNIFICANT CHANGE UP (ref 4.2–5.8)
RBC # FLD: 12.1 % — SIGNIFICANT CHANGE UP (ref 10.3–14.5)
SODIUM SERPL-SCNC: 140 MMOL/L — SIGNIFICANT CHANGE UP (ref 135–145)
TROPONIN T SERPL-MCNC: <0.01 NG/ML — SIGNIFICANT CHANGE UP (ref 0–0.01)
TSH SERPL-MCNC: 3.98 UIU/ML — SIGNIFICANT CHANGE UP (ref 0.35–4.94)
WBC # BLD: 6.76 K/UL — SIGNIFICANT CHANGE UP (ref 3.8–10.5)
WBC # FLD AUTO: 6.76 K/UL — SIGNIFICANT CHANGE UP (ref 3.8–10.5)

## 2019-03-18 PROCEDURE — 93653 COMPRE EP EVAL TX SVT: CPT

## 2019-03-18 PROCEDURE — 93613 INTRACARDIAC EPHYS 3D MAPG: CPT

## 2019-03-18 PROCEDURE — 99223 1ST HOSP IP/OBS HIGH 75: CPT

## 2019-03-18 PROCEDURE — 93010 ELECTROCARDIOGRAM REPORT: CPT

## 2019-03-18 PROCEDURE — 99220: CPT

## 2019-03-18 PROCEDURE — 93306 TTE W/DOPPLER COMPLETE: CPT | Mod: 26

## 2019-03-18 PROCEDURE — 93623 PRGRMD STIMJ&PACG IV RX NFS: CPT | Mod: 26

## 2019-03-18 PROCEDURE — 93621 COMP EP EVL L PAC&REC C SINS: CPT | Mod: 26

## 2019-03-18 RX ORDER — METOPROLOL TARTRATE 50 MG
12.5 TABLET ORAL
Qty: 0 | Refills: 0 | Status: DISCONTINUED | OUTPATIENT
Start: 2019-03-18 | End: 2019-03-18

## 2019-03-18 RX ORDER — HEPARIN SODIUM 5000 [USP'U]/ML
5000 INJECTION INTRAVENOUS; SUBCUTANEOUS EVERY 8 HOURS
Qty: 0 | Refills: 0 | Status: DISCONTINUED | OUTPATIENT
Start: 2019-03-18 | End: 2019-03-19

## 2019-03-18 RX ORDER — AMLODIPINE BESYLATE 2.5 MG/1
5 TABLET ORAL DAILY
Qty: 0 | Refills: 0 | Status: DISCONTINUED | OUTPATIENT
Start: 2019-03-18 | End: 2019-03-19

## 2019-03-18 RX ORDER — INFLUENZA VIRUS VACCINE 15; 15; 15; 15 UG/.5ML; UG/.5ML; UG/.5ML; UG/.5ML
0.5 SUSPENSION INTRAMUSCULAR ONCE
Qty: 0 | Refills: 0 | Status: COMPLETED | OUTPATIENT
Start: 2019-03-18 | End: 2019-03-18

## 2019-03-18 RX ORDER — METOPROLOL TARTRATE 50 MG
25 TABLET ORAL
Qty: 0 | Refills: 0 | Status: DISCONTINUED | OUTPATIENT
Start: 2019-03-18 | End: 2019-03-19

## 2019-03-18 RX ORDER — ACETAMINOPHEN 500 MG
650 TABLET ORAL ONCE
Qty: 0 | Refills: 0 | Status: COMPLETED | OUTPATIENT
Start: 2019-03-18 | End: 2019-03-18

## 2019-03-18 RX ORDER — AMLODIPINE BESYLATE 2.5 MG/1
1 TABLET ORAL
Qty: 0 | Refills: 0 | COMMUNITY

## 2019-03-18 RX ADMIN — Medication 650 MILLIGRAM(S): at 13:11

## 2019-03-18 RX ADMIN — Medication 650 MILLIGRAM(S): at 13:57

## 2019-03-18 RX ADMIN — AMLODIPINE BESYLATE 5 MILLIGRAM(S): 2.5 TABLET ORAL at 06:51

## 2019-03-18 RX ADMIN — Medication 25 MILLIGRAM(S): at 21:38

## 2019-03-18 RX ADMIN — Medication 12.5 MILLIGRAM(S): at 06:51

## 2019-03-18 RX ADMIN — HEPARIN SODIUM 5000 UNIT(S): 5000 INJECTION INTRAVENOUS; SUBCUTANEOUS at 21:38

## 2019-03-18 RX ADMIN — HEPARIN SODIUM 5000 UNIT(S): 5000 INJECTION INTRAVENOUS; SUBCUTANEOUS at 14:05

## 2019-03-18 RX ADMIN — HEPARIN SODIUM 5000 UNIT(S): 5000 INJECTION INTRAVENOUS; SUBCUTANEOUS at 06:59

## 2019-03-18 NOTE — H&P ADULT - NSICDXPROBLEM_GEN_ALL_CORE_FT
PROBLEM DIAGNOSES  Problem: AVNRT (AV zuleima re-entry tachycardia)  Assessment and Plan: EP consult in AM.  vs SVT.  Started Metoprolol 12.5mg PO bid

## 2019-03-18 NOTE — H&P ADULT - NSHPLABSRESULTS_GEN_ALL_CORE
16.5   8.07  )-----------( 327      ( 17 Mar 2019 20:05 )             47.6       03-17    137  |  99  |  12  ----------------------------<  130<H>  4.1   |  21<L>  |  0.88    Ca    10.3      17 Mar 2019 20:05    TPro  9.4<H>  /  Alb  4.8  /  TBili  0.8  /  DBili  x   /  AST  45<H>  /  ALT  34  /  AlkPhos  96  03-17      PT/INR - ( 17 Mar 2019 20:05 )   PT: 13.4 sec;   INR: 1.18          PTT - ( 17 Mar 2019 20:05 )  PTT:30.7 sec    CARDIAC MARKERS ( 17 Mar 2019 20:05 )  x     / <0.01 ng/mL / 192 U/L / x     / 1.6 ng/mL            EKG: SVT@125 bpm vs AVNRT

## 2019-03-18 NOTE — H&P ADULT - NSHPPHYSICALEXAM_GEN_ALL_CORE
T(C): 36.8 (03-18-19 @ 00:26), Max: 36.8 (03-18-19 @ 00:26)  HR: 118 (03-18-19 @ 00:58) (108 - 202)  BP: 130/91 (03-18-19 @ 00:58) (130/91 - 168/96)  RR: 18 (03-18-19 @ 00:58) (18 - 20)  SpO2: 100% (03-18-19 @ 00:58) (100% - 100%)  Wt(kg): --    Appearance: Normal	  HEENT:   Normal oral mucosa, PERRL, EOMI	  Neck: Supple, - JVD; No Carotid Bruit and 2+ pulses B/L  Cardiovascular:Sinus Tachy @114, No JVD, No murmurs  Respiratory: Lungs clear to auscultation/No Rales, Rhonchi, Wheezing	  Gastrointestinal:  Soft, Non-tender, + BS	  Skin: No rashes, No ecchymoses, No cyanosis  Extremities: Normal range of motion, No clubbing, cyanosis or edema  Vascular: Femoral pulses 2+ b/l without bruit, DP 2+ b/l, PT 1+ b/l  Neurologic: Non-focal  Psychiatry: A & O x 3, Mood & affect appropriate

## 2019-03-18 NOTE — H&P ADULT - NSHPREVIEWOFSYSTEMS_GEN_ALL_CORE
GENERAL, CONSTITUTIONAL : denies recent weight loss, fever, chills  EYES, VISION: denies changes in vision   EARS, NOSE, THROAT: denies hearing loss  HEART, CARDIOVASCULAR: admits to chest pain (tightness), palpitations denes arrhythmia,   RESPIRATORY: Admits to SOB Denies cough, , wheezing, PND, orthopnea  GASTROINTESTINAL: Denies abdominal pain, heartburn, bloody stool, dark tarry stool  GENITOURINARY: Denies frequent urination, urgency  MUSCULOSKELETAL denies joint pain or swelling, restricted motion, musculoskeletal pain.   SKIN & INTEGUMENTARY Denies rashes, sores, blisters, blisters, growths.  NEUROLOGICAL: Denies numbness or tingling sensations, sensation loss, burning.   PSYCHIATRIC: Denies nervousness, anxiety, depression  ENDOCRINE Denies heat or cold intolerance, excessive thirst  HEMATOLOGIC/LYMPHATIC: Denies abnormal bleeding, bleeding of any kind GENERAL, CONSTITUTIONAL : denies recent weight loss, fever, chills  EYES, VISION: denies changes in vision   EARS, NOSE, THROAT: denies hearing loss  HEART, CARDIOVASCULAR: admits to chest pain (tightness), palpitations denes arrhythmia,   RESPIRATORY: Admits to SOB Denies cough, , wheezing, PND, orthopnea  GASTROINTESTINAL: Denies abdominal pain, heartburn, bloody stool, dark tarry stool  GENITOURINARY: Denies frequent urination, urgency  MUSCULOSKELETAL denies joint pain or swelling, restricted motion, musculoskeletal pain.   SKIN & INTEGUMENTARY Denies rashes, sores, blisters, blisters, growths.  NEUROLOGICAL: admit to  numbness or tingling down right arm  denies sensations, sensation loss, burning.   PSYCHIATRIC: Admits to Anxiety Denies depression  ENDOCRINE Denies heat or cold intolerance, excessive thirst  HEMATOLOGIC/LYMPHATIC: Denies abnormal bleeding, bleeding of any kind

## 2019-03-18 NOTE — DISCHARGE NOTE PROVIDER - CARE PROVIDERS DIRECT ADDRESSES
[email or web address]

## 2019-03-18 NOTE — DISCHARGE NOTE PROVIDER - NSDCCPCAREPLAN_GEN_ALL_CORE_FT
PRINCIPAL DISCHARGE DIAGNOSIS  Diagnosis: SVT (supraventricular tachycardia)  Assessment and Plan of Treatment: You came to the hospital with chest discomfort and were found to be in an abnormal, fast heart rhythm. You were given a medication called adenosine which broke the abnormal rhythm. You were evaluated by electrophysiology and diagnosed with  atrioventricular zuleima re-entry tachycardia and underwent an ablation. Your cardiac enzymes (show if the heart is under stress) were negative. Your echocardiogram showed...      SECONDARY DISCHARGE DIAGNOSES  Diagnosis: Hypertension  Assessment and Plan of Treatment: PRINCIPAL DISCHARGE DIAGNOSIS  Diagnosis: SVT (supraventricular tachycardia)  Assessment and Plan of Treatment: You came to the hospital with chest discomfort and were found to be in an abnormal, fast heart rhythm. You were given a medication called adenosine which broke the abnormal rhythm. You were evaluated by electrophysiology and diagnosed with  atrioventricular zuleima re-entry tachycardia and underwent an ablation. Your cardiac enzymes (show if the heart is under stress) were negative. Your echocardiogram showed good heart function. Follow up with Dr. Lloyd in 1-2 weeks. Follow up with Dr. Mclaughlin on 5/1 at 1PM.      SECONDARY DISCHARGE DIAGNOSES  Diagnosis: Hypertension  Assessment and Plan of Treatment: Please continue medications as listed to keep your blood pressure controlled. For blood pressure that is too high or too low please see your doctor or go to the emergency room as necessary. PRINCIPAL DISCHARGE DIAGNOSIS  Diagnosis: SVT (supraventricular tachycardia)  Assessment and Plan of Treatment: You came to the hospital with chest discomfort and were found to be in an abnormal, fast heart rhythm. You were given a medication called adenosine which broke the abnormal rhythm. You were evaluated by electrophysiology and diagnosed with  atrioventricular zuleima re-entry tachycardia and underwent an ablation. Your cardiac enzymes (show if the heart is under stress) were negative. Your echocardiogram showed good heart function. Follow up with Dr. Lloyd in 1-2 weeks. Follow up with Dr. Mclaughlin on 5/1 at 1PM. You can take ibuprophen as needed for chest pain.      SECONDARY DISCHARGE DIAGNOSES  Diagnosis: Hypertension  Assessment and Plan of Treatment: Please continue medications as listed to keep your blood pressure controlled. For blood pressure that is too high or too low please see your doctor or go to the emergency room as necessary.

## 2019-03-18 NOTE — CONSULT NOTE ADULT - ASSESSMENT
29 y/o male former smoker h/o HTN, Anxiety and Sciatica BIBA to [facility] ER this evening, 3/17/19, complaining of sudden onset of chest tightness associated with right arm tingling with hand numbness and SOB. Found to have SVT @205bpm terminated with adenosine x 1.  He has been ruled out ACS with negative Cardiac enzyme x 4. Negative d-dimer.  No history of syncope / near-syncope.    - EKG in SVT is suggestive of short RP' interval, therefore suggestive of AVNRT. Treatment options were explained to him, which include medication and/or ablation.  He states that he is afraid to have recurrent episode again and is giving ablation a serious thought.  Risks of the procedure were explained to him including but not limited to bleeding, vascular damage, heart attack, stroke, and need for PPM.  He would like to talk to his mother first.   - Echo today please. 27 y/o male former smoker h/o HTN, Anxiety and Sciatica BIBA to [facility] ER this evening, 3/17/19, complaining of sudden onset of chest tightness associated with right arm tingling with hand numbness and SOB. Found to have SVT @205bpm terminated with adenosine x 1.  He has been ruled out ACS with negative Cardiac enzyme x 4. Negative d-dimer.  No history of syncope / near-syncope.    - EKG in SVT is suggestive of short RP' interval, therefore suggestive of AVNRT. Treatment options were explained to him, which include medication and/or ablation.  He states that he is afraid to have recurrent episode again and is giving ablation a serious thought.  Risks of the procedure were explained to him including but not limited to bleeding, vascular damage, heart attack, stroke, and need for PPM.  He would like to talk to his mother first.  Both the patient and his mother are agreeable to have procedure. We will plan for later case today. Continue NPO.   - Echo today please.

## 2019-03-18 NOTE — H&P ADULT - ASSESSMENT
29 y/o male former smoker with PMHx of HTN, Anxiety and Sciatica BIBA to [facility] ER this evening, 3/171/19, complaining of intermittent midsternal chest tightness associated with right arm tingling with hand numbness and SOB.    On arrival pt. noted to be in SVT @205bpm and was given Adenosine 6mg IV push revealing Sinus Tachy @125bpm vs AVNRT.

## 2019-03-18 NOTE — DISCHARGE NOTE PROVIDER - HOSPITAL COURSE
29yo M, former smoker, with PMHx of HTN, Anxiety and Sciatica BIBA to [facility] ED 3/17/19 complaining of CP and SOB. On arrival, patient was noted to be in AVNRT @ 198bpm and adenosine 6mg IV was pushed with resolution to SR. Pt states he had similar symptoms before in past in for which his PMD dx him with anxiety. Additionally, he was diagnosed with HTN about 5yrs ago and takes amlodipine 5mg QD. Trops were negative x 3. Echo revealed...        Patient was evaluated by EP who recommended AVNRT ablation. Patient is now s/p ablation with good result. He was seen and examined this AM and is asymptomatic without complaints. VSS, labs and tele reviewed. Patient is stable for discharge per Dr. Lloyd. Patient has received appropriate discharge instructions including medication regimen, access site management and follow up. 27yo M, former smoker, with PMHx of HTN, Anxiety and Sciatica BIBA to [facility] ED 3/17/19 complaining of CP and SOB. On arrival, patient was noted to be in AVNRT @ 198bpm and adenosine 6mg IV was pushed with resolution to SR. Pt states he had similar symptoms before in past in for which his PMD dx him with anxiety. Additionally, he was diagnosed with HTN about 5yrs ago and takes amlodipine 5mg QD. Trops were negative x 3. Echo revealed normal LV with EF 60%, trace MR, trace TR, no pericardial effusion. Patient was evaluated by EP who recommended AVNRT ablation. Patient underwent AVNRT ablation which was complicated by possible aflutter which was also ablated. He will continue metoprolol 25mg BID and follow up with EP as an outpatient. He was seen and examined this AM and is asymptomatic without complaints. VSS, labs and tele reviewed. Patient is stable for discharge per Dr. Lloyd. Patient has received appropriate discharge instructions including medication regimen, access site management and follow up.        Temp 98.6F, HR 78bpm, /62, RR 18, O2 sat 98% RA    Gen: NAD, A&O x 3    Cards: RRR, clear S1 and S2 without murmur    Pulm: CTA b/l without w/r/r    Abd: BS x 4, soft, NT    B/L groin: No hematoma or ooze, femoral pulse 2+ b/l without bruit, DP/PT 2+ b/l    Ext: No LE edema or ulcerations b/l

## 2019-03-18 NOTE — CONSULT NOTE ADULT - SUBJECTIVE AND OBJECTIVE BOX
Electrophysiology Consult Note:     CHIEF COMPLAINT:  Patient is a 28y old  Male who presents with a chief complaint of Intermittent, midsternal chest tightness associated with palpitations and SOB. (18 Mar 2019 01:44)        HISTORY OF PRESENT ILLNESS:   29 y/o male former smoker with PMHx of HTN, Anxiety and Sciatica BIBA to [facility] ER this evening, 3/17/19, complaining of sudden onset of chest tightness associated with right arm tingling with hand numbness and SOB. This happened while pt was watching TV.  On arrival pt, noted to be in SVT @205bpm and was given Adenosine 6mg IV push which terminated the tachycardia.  He states that today he still feels a little sore in the chest area.  This is the first episode that happened in such intensity. In the past, he c/o "anxiety episodes" that resolved on their own. No history of syncope / near-syncope.    He has been ruled out ACS with negative Cardiac enzyme x 4. Negative d-dimer.   FHx significant for father  at age 31 with "cardiac arrest".       PAST MEDICAL & SURGICAL HISTORY:  HTN (hypertension)  Sciatica  No significant past surgical history      FAMILY HISTORY:  Father  at age 31; was obese and had lung issue, but then was told to have  of "cardiac arrest".   Family hx of hypertension in father and mother's side      SOCIAL HISTORY:    Smokes tobacco occasionally  Social drinker 2 x per week (few beers)  Denies illicit drug use   In between jobs now - used to work in a [employer]       Allergies:  No Known Allergies  	    MEDICATIONS  (STANDING):  amLODIPine   Tablet 5 milliGRAM(s) Oral daily  heparin  Injectable 5000 Unit(s) SubCutaneous every 8 hours  metoprolol tartrate 12.5 milliGRAM(s) Oral two times a day        REVIEW OF SYSTEMS:  CONSTITUTIONAL: No fever, weight loss, or fatigue  EYES: No eye pain, visual disturbances, or discharge  ENMT:  No difficulty hearing, tinnitus, vertigo; No sinus or throat pain  NECK: No pain or stiffness  BREASTS: No pain, masses, or nipple discharge  RESPIRATORY: No cough, wheezing, chills or hemoptysis; No Shortness of Breath  CARDIOVASCULAR: See HPI.   GASTROINTESTINAL: No abdominal or epigastric pain. No nausea, vomiting, or hematemesis; No diarrhea or constipation. No melena or hematochezia.  GENITOURINARY: No dysuria, frequency, hematuria, or incontinence  NEUROLOGICAL: No headaches, memory loss, loss of strength, numbness, or tremors  SKIN: No itching, burning, rashes, or lesions   LYMPH Nodes: No enlarged glands  ENDOCRINE: No heat or cold intolerance; No hair loss  MUSCULOSKELETAL: c/o sciatic pain sometimes. Denies other joint pain or swelling   PSYCHIATRIC: No depression, anxiety, mood swings, or difficulty sleeping  HEME/LYMPH: No easy bruising, or bleeding gums  ALLERY AND IMMUNOLOGIC: No hives or eczema	      PHYSICAL EXAM:  Vital Signs Last 24 Hrs  T(C): 36.2 (18 Mar 2019 10:16), Max: 36.8 (18 Mar 2019 00:26)  T(F): 97.2 (18 Mar 2019 10:16), Max: 98.2 (18 Mar 2019 00:26)  HR: 84 (18 Mar 2019 08:40) (84 - 202)  BP: 131/86 (18 Mar 2019 08:40) (130/91 - 168/96)  RR: 16 (18 Mar 2019 08:40) (16 - 20)  SpO2: 97% (18 Mar 2019 08:40) (97% - 100%)  Daily Height in cm: 177.8 (18 Mar 2019 00:26)    Daily Weight in k.1 (18 Mar 2019 00:58)    Constitutional: NAD	  HEENT:   Normal oral mucosa, PERRL, EOMI	  NECK: No JVD,  CVS: Normal S1 S2, RRR,  No murmurs  Pulm: Lungs clear to auscultation	  GI:  + BS, Soft, NT/ND   Ext: No LE edema  Vascular: Peripheral pulses palpable 2+ bilaterally  MS: full range of motion in all joints  Neurologic: A&O x 3, Non-focal  Psych: Pleasant, has good insight  Skin: No rash or lesion       	  LABS:	                         15.8   6.76  )-----------( 294      ( 18 Mar 2019 07:08 )             46.7     03-18    140  |  102  |  9   ----------------------------<  79  3.7   |  23  |  0.88    Ca    9.4      18 Mar 2019 07:08  Mg     2.0     03-18    TPro  8.0  /  Alb  4.1  /  TBili  1.0  /  DBili  x   /  AST  30  /  ALT  26  /  AlkPhos  81      proBNP: Serum Pro-Brain Natriuretic Peptide: 122 pg/mL ( @ 07:08)    Lipid Profile:     TSH: Thyroid Stimulating Hormone, Serum: 3.981 uIU/mL ( @ 07:08)  Thyroid Stimulating Hormone, Serum: 1.046 uIU/mL ( @ 20:05)  	      EKG: 3/17/19: Narrow complex tachycardia  bpm.  Possibly short RP' noted in inferior leads and V1  EKG: 3/17/19 (post Adenosine): sinus tachy 125 bpm. Normal intervals.  . QRS 74. Qtc 430.  TWI in lead III.     Telemetry: NSR HR 80s     Echo: Pending Electrophysiology Consult Note:     CHIEF COMPLAINT:  Patient is a 28y old  Male who presents with a chief complaint of Intermittent, midsternal chest tightness associated with palpitations and SOB. (18 Mar 2019 01:44)        HISTORY OF PRESENT ILLNESS:   27 y/o male former smoker with PMHx of HTN, Anxiety and Sciatica BIBA to [facility] ER this evening, 3/17/19, complaining of sudden onset of chest tightness associated with right arm tingling with hand numbness and SOB. This happened while pt was watching TV.  On arrival pt, noted to be in SVT @205bpm and was given Adenosine 6mg IV push which terminated the tachycardia.  He states that today he still feels a little sore in the chest area.  This is the first episode that happened in such intensity. In the past, he c/o "anxiety episodes" that resolved on their own. No history of syncope / near-syncope.    He has been ruled out ACS with negative Cardiac enzyme x 4. Negative d-dimer.       PAST MEDICAL & SURGICAL HISTORY:  HTN (hypertension)  Sciatica  No significant past surgical history      FAMILY HISTORY:  Father  at age 31; was obese and had COPD,  in the hospital of "cardiac arrest". Autopsy was indeterminate as per pt's mother.   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       Allergies:  No Known Allergies  	    MEDICATIONS  (STANDING):  amLODIPine   Tablet 5 milliGRAM(s) Oral daily  heparin  Injectable 5000 Unit(s) SubCutaneous every 8 hours  metoprolol tartrate 12.5 milliGRAM(s) Oral two times a day        REVIEW OF SYSTEMS:  CONSTITUTIONAL: No fever, weight loss, or fatigue  EYES: No eye pain, visual disturbances, or discharge  ENMT:  No difficulty hearing, tinnitus, vertigo; No sinus or throat pain  NECK: No pain or stiffness  BREASTS: No pain, masses, or nipple discharge  RESPIRATORY: No cough, wheezing, chills or hemoptysis; No Shortness of Breath  CARDIOVASCULAR: See HPI.   GASTROINTESTINAL: No abdominal or epigastric pain. No nausea, vomiting, or hematemesis; No diarrhea or constipation. No melena or hematochezia.  GENITOURINARY: No dysuria, frequency, hematuria, or incontinence  NEUROLOGICAL: No headaches, memory loss, loss of strength, numbness, or tremors  SKIN: No itching, burning, rashes, or lesions   LYMPH Nodes: No enlarged glands  ENDOCRINE: No heat or cold intolerance; No hair loss  MUSCULOSKELETAL: c/o sciatic pain sometimes. Denies other joint pain or swelling   PSYCHIATRIC: No depression, anxiety, mood swings, or difficulty sleeping  HEME/LYMPH: No easy bruising, or bleeding gums  ALLERY AND IMMUNOLOGIC: No hives or eczema	      PHYSICAL EXAM:  Vital Signs Last 24 Hrs  T(C): 36.2 (18 Mar 2019 10:16), Max: 36.8 (18 Mar 2019 00:26)  T(F): 97.2 (18 Mar 2019 10:16), Max: 98.2 (18 Mar 2019 00:26)  HR: 84 (18 Mar 2019 08:40) (84 - 202)  BP: 131/86 (18 Mar 2019 08:40) (130/91 - 168/96)  RR: 16 (18 Mar 2019 08:40) (16 - 20)  SpO2: 97% (18 Mar 2019 08:40) (97% - 100%)  Daily Height in cm: 177.8 (18 Mar 2019 00:26)    Daily Weight in k.1 (18 Mar 2019 00:58)    Constitutional: NAD	  HEENT:   Normal oral mucosa, PERRL, EOMI	  NECK: No JVD,  CVS: Normal S1 S2, RRR,  No murmurs  Pulm: Lungs clear to auscultation	  GI:  + BS, Soft, NT/ND   Ext: No LE edema  Vascular: Peripheral pulses palpable 2+ bilaterally  MS: full range of motion in all joints  Neurologic: A&O x 3, Non-focal  Psych: Pleasant, has good insight  Skin: No rash or lesion       	  LABS:	                         15.8   6.76  )-----------( 294      ( 18 Mar 2019 07:08 )             46.7     03-18    140  |  102  |  9   ----------------------------<  79  3.7   |  23  |  0.88    Ca    9.4      18 Mar 2019 07:08  Mg     2.0     03-18    TPro  8.0  /  Alb  4.1  /  TBili  1.0  /  DBili  x   /  AST  30  /  ALT  26  /  AlkPhos  81  03-18    proBNP: Serum Pro-Brain Natriuretic Peptide: 122 pg/mL ( @ 07:08)    Lipid Profile:     TSH: Thyroid Stimulating Hormone, Serum: 3.981 uIU/mL ( @ 07:08)  Thyroid Stimulating Hormone, Serum: 1.046 uIU/mL ( @ 20:05)  	      EKG: 3/17/19: Narrow complex tachycardia  bpm.  Possibly short RP' noted in inferior leads and V1  EKG: 3/17/19 (post Adenosine): sinus tachy 125 bpm. Normal intervals.  . QRS 74. Qtc 430.  TWI in lead III.     Telemetry: NSR HR 80s     Echo: Pending

## 2019-03-18 NOTE — H&P ADULT - HISTORY OF PRESENT ILLNESS
27 y/o male former smoker with PMHx of HTN, Anxiety and Sciatica BIBA to [facility] ER this evening, 3/171/19, complaining of intermittent midsternal chest tightness associated with right arm tingling with hand numbness and SOB.    On arrival pt. noted to be in SVT @205bpm and was given Adenosine 6mg IV push revealing Sinus Tachy @125bpm vs AVNRT.       According to patient, he was sitting down watching television when suddenly he felt a band of tightness across his chest with right arm and hand numbness tingling  associated with SOB. Pt. states he assumed he was having a heart attack and called EMS.  Pt. endorses   having similar symptoms before in past in which PCP assumed he had anxiety and placed pt. on a antianxiety agent. (pt. does not recall which one).  Pt. reports recently being diagnosed with HTN and is currently on Amlodpine 5mg PO daily.     In ER on telemetry pt. noted to be in SVT @205bpm adenosine 6mg IV pushed with out portable ECG running, CCU Fellow made aware after the Adenosine push.  After Adenosine ECG reveals AVNRT vs Sinus Tach @ 125bpm, Troponin neg, CXR clear with no acute pathology, /70 27 y/o male former smoker with PMHx of HTN, Anxiety and Sciatica BIBA to [facility] ER this evening, 3/171/19, complaining of intermittent midsternal chest tightness associated with right arm tingling with hand numbness and SOB.    On arrival pt. noted to be in SVT @205bpm and was given Adenosine 6mg IV push revealing Sinus Tachy @125bpm vs AVNRT.       According to patient, he was sitting down watching television when suddenly he felt a band of tightness across his chest with right arm and hand numbness tingling  associated with SOB. Pt. states he assumed he was having a heart attack and called EMS.  Pt. endorses   having similar symptoms before in past in which PCP assumed he had anxiety and placed pt. on a antianxiety agent. (pt. does not recall which one).  Pt. reports recently being diagnosed with HTN and is currently on Amlodpine 5mg PO daily.     In ER on telemetry pt. noted to be in SVT @205bpm adenosine 6mg IV pushed with out portable ECG running, CCU Fellow made aware after the Adenosine push.  After Adenosine ECG reveals AVNRT vs Sinus Tach @ 125bpm, Troponin neg X3, D-dimer neg, TSH normal range, Blood Glucose 130, CXR clear with no acute pathology, /70.  Pt. admitted to [facility] for telemetry, ECG, serial CE, Echocardiogram, EP consult possible AVNRT and NPO for further tests.  F/U blood work, Utox.  Maybe workup for Pheochromocytoma?

## 2019-03-18 NOTE — PROGRESS NOTE ADULT - NSICDXPROBLEM_GEN_ALL_CORE_FT
PROBLEM DIAGNOSES  Problem: AVNRT (AV zuleima re-entry tachycardia)  Assessment and Plan: Currently in SR with HR 80s-90s  - continue metoprolol 12.5mg BID with plan to switch to XL on discharge  - NPO for ablation with EP today  - JULIA proctor pleuritic vs musclar vs anxiety related, trops neg x 3  - follow up echo    Problem: HTN (hypertension)  Assessment and Plan: SBP 130s-140s  - continue amlodipine 5mg QD and metoprolol 12.5mg BID  - plan for further HTN workup as an outpatient  DVT PPx: SCDs, ambulatory  DISPO: Pending ablation today

## 2019-03-18 NOTE — PROGRESS NOTE ADULT - ASSESSMENT
29yo M, former smoker, with PMHx of HTN, Anxiety and Sciatica who was BIBA to [facility] ED 3/17 complaining of chest tightness and SOB found to be in AVNRT @ 205bpm converted to SR with adenosine 6mg IV x 1. NPO for ablation today.

## 2019-03-18 NOTE — CHART NOTE - NSCHARTNOTEFT_GEN_A_CORE
PA called to bedside for pt complaining of chest pain. Upon arrival to bedside, patient resting comfortably in bed, VSS. Patient endorsing mild chest tightness worsened with inspiration and palpation. No events noted on tele. Given tylenol 650mg PO x 1 and EKG to be performed. Will continue to closely monitor.

## 2019-03-18 NOTE — DISCHARGE NOTE PROVIDER - CARE PROVIDER_API CALL
Albertina Lloyd)  Cardiology; Internal Medicine  [address] [address]  Phone: [phone]  Fax: [phone]  Follow Up Time:     Konstantin Mclaughlin)  Cardiac Electrophysiology; Cardiology; Cardiovascular Disease  [address], [address] [place] [address]  Phone: [phone]  Fax: [phone]  Follow Up Time:

## 2019-03-18 NOTE — PROGRESS NOTE ADULT - SUBJECTIVE AND OBJECTIVE BOX
Interventional Cardiology PA Adult Progress Note    CC: "I am still having some mild chest discomfort"    Subjective Assessment: Patient was seen and examined this AM and reported continued mild chest discomfort worse with inspiration and palpation. Given tylenol with improvement.    ROS otherwise negative except per subjective  	  MEDICATIONS:  amLODIPine   Tablet 5 milliGRAM(s) Oral daily  metoprolol tartrate 12.5 milliGRAM(s) Oral two times a day  heparin  Injectable 5000 Unit(s) SubCutaneous every 8 hours  	    [PHYSICAL EXAM:  TELEMETRY:  T(C): 36.2 (03-18-19 @ 10:16), Max: 36.8 (03-18-19 @ 00:26)  HR: 74 (03-18-19 @ 12:31) (74 - 202)  BP: 147/73 (03-18-19 @ 12:31) (130/91 - 168/96)  RR: 18 (03-18-19 @ 12:31) (16 - 20)  SpO2: 98% (03-18-19 @ 12:31) (97% - 100%)  Wt(kg): --  I&O's Summary    18 Mar 2019 07:01  -  18 Mar 2019 13:32  --------------------------------------------------------  IN: 0 mL / OUT: 500 mL / NET: -500 mL      Height (cm): 177.8 (03-18 @ 00:26)  Weight (kg): 76.1 (03-18 @ 00:26)  BMI (kg/m2): 24.1 (03-18 @ 00:26)  BSA (m2): 1.94 (03-18 @ 00:26)                                       Appearance: Normal	  HEENT:   Normal oral mucosa, PERRL, EOMI	  Neck: Supple, - JVD; No Carotid Bruits b/l  Cardiovascular: Normal S1 S2, No JVD, No murmurs  Respiratory: Lungs clear to auscultation, No Rales, Rhonchi, Wheezing	  Gastrointestinal:  Soft, Non-tender, + BS	  Skin: No rashes, No ecchymoses, No cyanosis  Extremities: Normal range of motion, No clubbing, cyanosis or edema  Vascular: Peripheral pulses palpable 2+ bilaterally  Neurologic: Non-focal  Psychiatry: A & O x 3, Mood & affect appropriate  	    LABS:	 	  CARDIAC MARKERS:                            15.8   6.76  )-----------( 294      ( 18 Mar 2019 07:08 )             46.7     03-18    140  |  102  |  9   ----------------------------<  79  3.7   |  23  |  0.88    Ca    9.4      18 Mar 2019 07:08  Mg     2.0     03-18    TPro  8.0  /  Alb  4.1  /  TBili  1.0  /  DBili  x   /  AST  30  /  ALT  26  /  AlkPhos  81  03-18    proBNP: Serum Pro-Brain Natriuretic Peptide: 122 pg/mL (03-18 @ 07:08)    TSH: Thyroid Stimulating Hormone, Serum: 3.981 uIU/mL (03-18 @ 07:08)  Thyroid Stimulating Hormone, Serum: 1.046 uIU/mL (03-17 @ 20:05)    PT/INR - ( 18 Mar 2019 07:24 )   PT: 13.1 sec;   INR: 1.15          PTT - ( 18 Mar 2019 07:24 )  PTT:34.0 sec

## 2019-03-19 ENCOUNTER — TRANSCRIPTION ENCOUNTER (OUTPATIENT)
Age: 29
End: 2019-03-19

## 2019-03-19 VITALS — TEMPERATURE: 98 F

## 2019-03-19 LAB
ANION GAP SERPL CALC-SCNC: 14 MMOL/L — SIGNIFICANT CHANGE UP (ref 5–17)
BUN SERPL-MCNC: 7 MG/DL — SIGNIFICANT CHANGE UP (ref 7–23)
CALCIUM SERPL-MCNC: 9.3 MG/DL — SIGNIFICANT CHANGE UP (ref 8.4–10.5)
CHLORIDE SERPL-SCNC: 99 MMOL/L — SIGNIFICANT CHANGE UP (ref 96–108)
CO2 SERPL-SCNC: 24 MMOL/L — SIGNIFICANT CHANGE UP (ref 22–31)
CREAT SERPL-MCNC: 0.93 MG/DL — SIGNIFICANT CHANGE UP (ref 0.5–1.3)
GLUCOSE SERPL-MCNC: 97 MG/DL — SIGNIFICANT CHANGE UP (ref 70–99)
HCT VFR BLD CALC: 46.1 % — SIGNIFICANT CHANGE UP (ref 39–50)
HGB BLD-MCNC: 15.6 G/DL — SIGNIFICANT CHANGE UP (ref 13–17)
MAGNESIUM SERPL-MCNC: 1.9 MG/DL — SIGNIFICANT CHANGE UP (ref 1.6–2.6)
MCHC RBC-ENTMCNC: 30.6 PG — SIGNIFICANT CHANGE UP (ref 27–34)
MCHC RBC-ENTMCNC: 33.8 GM/DL — SIGNIFICANT CHANGE UP (ref 32–36)
MCV RBC AUTO: 90.6 FL — SIGNIFICANT CHANGE UP (ref 80–100)
NRBC # BLD: 0 /100 WBCS — SIGNIFICANT CHANGE UP (ref 0–0)
PLATELET # BLD AUTO: 278 K/UL — SIGNIFICANT CHANGE UP (ref 150–400)
POTASSIUM SERPL-MCNC: 3.6 MMOL/L — SIGNIFICANT CHANGE UP (ref 3.5–5.3)
POTASSIUM SERPL-SCNC: 3.6 MMOL/L — SIGNIFICANT CHANGE UP (ref 3.5–5.3)
RBC # BLD: 5.09 M/UL — SIGNIFICANT CHANGE UP (ref 4.2–5.8)
RBC # FLD: 11.9 % — SIGNIFICANT CHANGE UP (ref 10.3–14.5)
SODIUM SERPL-SCNC: 137 MMOL/L — SIGNIFICANT CHANGE UP (ref 135–145)
WBC # BLD: 5.82 K/UL — SIGNIFICANT CHANGE UP (ref 3.8–10.5)
WBC # FLD AUTO: 5.82 K/UL — SIGNIFICANT CHANGE UP (ref 3.8–10.5)

## 2019-03-19 PROCEDURE — 99232 SBSQ HOSP IP/OBS MODERATE 35: CPT

## 2019-03-19 PROCEDURE — 99217: CPT

## 2019-03-19 PROCEDURE — 93010 ELECTROCARDIOGRAM REPORT: CPT

## 2019-03-19 RX ORDER — METOPROLOL TARTRATE 50 MG
1 TABLET ORAL
Qty: 60 | Refills: 1 | OUTPATIENT
Start: 2019-03-19 | End: 2019-05-17

## 2019-03-19 RX ORDER — POTASSIUM CHLORIDE 20 MEQ
40 PACKET (EA) ORAL ONCE
Qty: 0 | Refills: 0 | Status: COMPLETED | OUTPATIENT
Start: 2019-03-19 | End: 2019-03-19

## 2019-03-19 RX ORDER — MAGNESIUM OXIDE 400 MG ORAL TABLET 241.3 MG
400 TABLET ORAL ONCE
Qty: 0 | Refills: 0 | Status: COMPLETED | OUTPATIENT
Start: 2019-03-19 | End: 2019-03-19

## 2019-03-19 RX ORDER — ZOLPIDEM TARTRATE 10 MG/1
5 TABLET ORAL ONCE
Qty: 0 | Refills: 0 | Status: DISCONTINUED | OUTPATIENT
Start: 2019-03-19 | End: 2019-03-19

## 2019-03-19 RX ORDER — ACETAMINOPHEN 500 MG
650 TABLET ORAL ONCE
Qty: 0 | Refills: 0 | Status: COMPLETED | OUTPATIENT
Start: 2019-03-19 | End: 2019-03-19

## 2019-03-19 RX ADMIN — Medication 650 MILLIGRAM(S): at 13:56

## 2019-03-19 RX ADMIN — Medication 40 MILLIEQUIVALENT(S): at 12:14

## 2019-03-19 RX ADMIN — HEPARIN SODIUM 5000 UNIT(S): 5000 INJECTION INTRAVENOUS; SUBCUTANEOUS at 13:23

## 2019-03-19 RX ADMIN — Medication 650 MILLIGRAM(S): at 13:21

## 2019-03-19 RX ADMIN — ZOLPIDEM TARTRATE 5 MILLIGRAM(S): 10 TABLET ORAL at 01:05

## 2019-03-19 RX ADMIN — HEPARIN SODIUM 5000 UNIT(S): 5000 INJECTION INTRAVENOUS; SUBCUTANEOUS at 05:40

## 2019-03-19 RX ADMIN — AMLODIPINE BESYLATE 5 MILLIGRAM(S): 2.5 TABLET ORAL at 12:14

## 2019-03-19 RX ADMIN — MAGNESIUM OXIDE 400 MG ORAL TABLET 400 MILLIGRAM(S): 241.3 TABLET ORAL at 12:14

## 2019-03-19 RX ADMIN — Medication 25 MILLIGRAM(S): at 12:13

## 2019-03-19 NOTE — DISCHARGE NOTE NURSING/CASE MANAGEMENT/SOCIAL WORK - NSDCDPATPORTLINK_GEN_ALL_CORE
You can access the [employer] Patient Portal, offered by [facility] [place], by registering with the following website: [email or web address]

## 2019-03-19 NOTE — PROGRESS NOTE ADULT - ASSESSMENT
27 y/o M with h/o anxiety, HTN, p/w palpitations with EKG noted SVT up to 200 bpm. EKG was suggestive of AVNRT (short RP'). Echo was normal with normal EF and no valve disease. He underwent EPS and ablation of AVNRT yesterday.  However, EPS also suggested presence of other atrial arrhythmia ie AT.   - Recommend Toprol 25 mg daily for now.  If he has palpitations, he should give us a call.  Otherwise, he can f/u with DR. Mclaughlin on 5/1/19 at 1 PM here at [facility]. 29 y/o M with h/o anxiety, HTN, p/w palpitations with EKG noted SVT up to 200 bpm. EKG was suggestive of AVNRT (short RP'). Echo was normal with normal EF and no valve disease. He underwent EPS and ablation of AVNRT yesterday.  However, EPS also suggested presence of other atrial arrhythmia ie AT.   - Recommend Toprol 25 mg daily for now.  If he has palpitations, he should give us a call.  Otherwise, he can f/u with DR. Mclaughlin on 5/1/19 at 1 PM here at [facility].    sf

## 2019-03-19 NOTE — PROGRESS NOTE ADULT - SUBJECTIVE AND OBJECTIVE BOX
EPS Progress Note  CC: SVT     S: feels ok today. s/p EPS / ablation yesterday.  hasnt' been ambulating yet. No pain at the groin wound    O: T(C): 36.6 (03-19-19 @ 09:08), Max: 37 (03-19-19 @ 01:00)  HR: 73 (03-19-19 @ 08:15) (58 - 94)  BP: 134/95 (03-19-19 @ 08:15) (100/59 - 146/93)  RR: 16 (03-19-19 @ 08:15) (16 - 18)  SpO2: 98% (03-19-19 @ 08:15) (96% - 98%)    TELE: NSR HR 60.  50s when asleep     PHYSICAL  Constitutional:  NAD        Neck: No JVD  Pulm:  CTA B/L, no wheeze or rale   Cardiac:   + s1/s2, RRR, no murmur   GI:  +BS , soft ND/NT  Vascular: No LE edema, pulse 2+. Right groin wound stable without hematoma.   Neuro: AAO x 3. no focal deficit  Skin: Warm. No rash or lesion     LABS:                        15.6   5.82  )-----------( 278      ( 19 Mar 2019 05:37 )             46.1     03-19    137  |  99  |  7   ----------------------------<  97  3.6   |  24  |  0.93    Ca    9.3      19 Mar 2019 05:37  Mg     1.9     03-19    TPro  8.0  /  Alb  4.1  /  TBili  1.0  /  DBili  x   /  AST  30  /  ALT  26  /  AlkPhos  81  03-18    PT/INR - ( 18 Mar 2019 07:24 )   PT: 13.1 sec;   INR: 1.15          PTT - ( 18 Mar 2019 07:24 )  PTT:34.0 sec    MEDICATIONS:  amLODIPine   Tablet 5 milliGRAM(s) Oral daily  heparin  Injectable 5000 Unit(s) SubCutaneous every 8 hours  metoprolol tartrate 25 milliGRAM(s) Oral two times a day

## 2019-03-22 DIAGNOSIS — M54.30 SCIATICA, UNSPECIFIED SIDE: ICD-10-CM

## 2019-03-22 DIAGNOSIS — I47.1 SUPRAVENTRICULAR TACHYCARDIA: ICD-10-CM

## 2019-03-22 DIAGNOSIS — Z87.891 PERSONAL HISTORY OF NICOTINE DEPENDENCE: ICD-10-CM

## 2019-03-22 DIAGNOSIS — F41.9 ANXIETY DISORDER, UNSPECIFIED: ICD-10-CM

## 2019-03-22 DIAGNOSIS — I10 ESSENTIAL (PRIMARY) HYPERTENSION: ICD-10-CM

## 2019-03-29 ENCOUNTER — APPOINTMENT (OUTPATIENT)
Dept: HEART AND VASCULAR | Facility: CLINIC | Age: 29
End: 2019-03-29
Payer: MEDICAID

## 2019-03-29 VITALS
TEMPERATURE: 98.7 F | HEIGHT: 68.11 IN | BODY MASS INDEX: 25.76 KG/M2 | OXYGEN SATURATION: 100 % | SYSTOLIC BLOOD PRESSURE: 118 MMHG | WEIGHT: 170 LBS | HEART RATE: 57 BPM | DIASTOLIC BLOOD PRESSURE: 75 MMHG

## 2019-03-29 PROCEDURE — 93000 ELECTROCARDIOGRAM COMPLETE: CPT

## 2019-03-29 PROCEDURE — 99214 OFFICE O/P EST MOD 30 MIN: CPT

## 2019-03-29 RX ORDER — METOPROLOL TARTRATE 25 MG/1
25 TABLET, FILM COATED ORAL TWICE DAILY
Refills: 0 | Status: DISCONTINUED | COMMUNITY
End: 2019-03-29

## 2019-03-29 NOTE — PHYSICAL EXAM
[General Appearance - Well Developed] : well developed [Normal Appearance] : normal appearance [Well Groomed] : well groomed [General Appearance - Well Nourished] : well nourished [No Deformities] : no deformities [General Appearance - In No Acute Distress] : no acute distress [Normal Conjunctiva] : the conjunctiva exhibited no abnormalities [Eyelids - No Xanthelasma] : the eyelids demonstrated no xanthelasmas [Normal Oral Mucosa] : normal oral mucosa [No Oral Pallor] : no oral pallor [No Oral Cyanosis] : no oral cyanosis [Normal Jugular Venous A Waves Present] : normal jugular venous A waves present [Normal Jugular Venous V Waves Present] : normal jugular venous V waves present [No Jugular Venous Silverman A Waves] : no jugular venous silverman A waves [Respiration, Rhythm And Depth] : normal respiratory rhythm and effort [Exaggerated Use Of Accessory Muscles For Inspiration] : no accessory muscle use [Auscultation Breath Sounds / Voice Sounds] : lungs were clear to auscultation bilaterally [Heart Rate And Rhythm] : heart rate and rhythm were normal [Heart Sounds] : normal S1 and S2 [Murmurs] : no murmurs present [Abdomen Soft] : soft [Abdomen Tenderness] : non-tender [Abdomen Mass (___ Cm)] : no abdominal mass palpated [Abnormal Walk] : normal gait [Gait - Sufficient For Exercise Testing] : the gait was sufficient for exercise testing [Nail Clubbing] : no clubbing of the fingernails [Cyanosis, Localized] : no localized cyanosis [Petechial Hemorrhages (___cm)] : no petechial hemorrhages [Skin Color & Pigmentation] : normal skin color and pigmentation [] : no rash [No Venous Stasis] : no venous stasis [Skin Lesions] : no skin lesions [No Skin Ulcers] : no skin ulcer [No Xanthoma] : no  xanthoma was observed [Oriented To Time, Place, And Person] : oriented to person, place, and time [Affect] : the affect was normal [Mood] : the mood was normal [No Anxiety] : not feeling anxious

## 2019-03-29 NOTE — ASSESSMENT
[FreeTextEntry1] : avnrt metoprolol for three months then can stop\par htn controlled on amlodipine has a strong FH of htn no need for secondary HTN evaluation he is well controlled on one agent

## 2019-03-29 NOTE — HISTORY OF PRESENT ILLNESS
[FreeTextEntry1] : 28 M HTN AVNRT s/p ablation here for follow up feels ok no complaints no further palpitations\par \par \par ecg sinus bradycardia

## 2019-04-04 PROCEDURE — 85027 COMPLETE CBC AUTOMATED: CPT

## 2019-04-04 PROCEDURE — 85652 RBC SED RATE AUTOMATED: CPT

## 2019-04-04 PROCEDURE — 80053 COMPREHEN METABOLIC PANEL: CPT

## 2019-04-04 PROCEDURE — 85025 COMPLETE CBC W/AUTO DIFF WBC: CPT

## 2019-04-04 PROCEDURE — 83735 ASSAY OF MAGNESIUM: CPT

## 2019-04-04 PROCEDURE — 82553 CREATINE MB FRACTION: CPT

## 2019-04-04 PROCEDURE — C1894: CPT

## 2019-04-04 PROCEDURE — 80048 BASIC METABOLIC PNL TOTAL CA: CPT

## 2019-04-04 PROCEDURE — 83880 ASSAY OF NATRIURETIC PEPTIDE: CPT

## 2019-04-04 PROCEDURE — 93306 TTE W/DOPPLER COMPLETE: CPT

## 2019-04-04 PROCEDURE — 36415 COLL VENOUS BLD VENIPUNCTURE: CPT

## 2019-04-04 PROCEDURE — C1733: CPT

## 2019-04-04 PROCEDURE — 85730 THROMBOPLASTIN TIME PARTIAL: CPT

## 2019-04-04 PROCEDURE — 99291 CRITICAL CARE FIRST HOUR: CPT | Mod: 25

## 2019-04-04 PROCEDURE — 82550 ASSAY OF CK (CPK): CPT

## 2019-04-04 PROCEDURE — 86140 C-REACTIVE PROTEIN: CPT

## 2019-04-04 PROCEDURE — 96374 THER/PROPH/DIAG INJ IV PUSH: CPT

## 2019-04-04 PROCEDURE — 84484 ASSAY OF TROPONIN QUANT: CPT

## 2019-04-04 PROCEDURE — C1730: CPT

## 2019-04-04 PROCEDURE — 84443 ASSAY THYROID STIM HORMONE: CPT

## 2019-04-04 PROCEDURE — 85610 PROTHROMBIN TIME: CPT

## 2019-04-04 PROCEDURE — 71046 X-RAY EXAM CHEST 2 VIEWS: CPT

## 2019-04-04 PROCEDURE — C1893: CPT

## 2019-04-04 PROCEDURE — 93005 ELECTROCARDIOGRAM TRACING: CPT

## 2019-04-19 ENCOUNTER — MOBILE ON CALL (OUTPATIENT)
Age: 29
End: 2019-04-19

## 2019-05-01 ENCOUNTER — APPOINTMENT (OUTPATIENT)
Dept: HEART AND VASCULAR | Facility: CLINIC | Age: 29
End: 2019-05-01
Payer: MEDICAID

## 2019-05-01 ENCOUNTER — NON-APPOINTMENT (OUTPATIENT)
Age: 29
End: 2019-05-01

## 2019-05-01 VITALS — DIASTOLIC BLOOD PRESSURE: 75 MMHG | HEART RATE: 77 BPM | HEIGHT: 68 IN | SYSTOLIC BLOOD PRESSURE: 139 MMHG

## 2019-05-01 PROCEDURE — 99214 OFFICE O/P EST MOD 30 MIN: CPT | Mod: 25

## 2019-05-01 PROCEDURE — 93000 ELECTROCARDIOGRAM COMPLETE: CPT

## 2019-05-13 NOTE — PHYSICAL EXAM
[Normal Appearance] : normal appearance [General Appearance - Well Developed] : well developed [Well Groomed] : well groomed [General Appearance - Well Nourished] : well nourished [General Appearance - In No Acute Distress] : no acute distress [No Deformities] : no deformities [Normal Conjunctiva] : the conjunctiva exhibited no abnormalities [Normal Oral Mucosa] : normal oral mucosa [Respiration, Rhythm And Depth] : normal respiratory rhythm and effort [Normal Jugular Venous V Waves Present] : normal jugular venous V waves present [Auscultation Breath Sounds / Voice Sounds] : lungs were clear to auscultation bilaterally [Exaggerated Use Of Accessory Muscles For Inspiration] : no accessory muscle use [Heart Rate And Rhythm] : heart rate and rhythm were normal [Heart Sounds] : normal S1 and S2 [Abnormal Walk] : normal gait [Cyanosis, Localized] : no localized cyanosis [Skin Color & Pigmentation] : normal skin color and pigmentation [Skin Turgor] : normal skin turgor [] : no rash [No Venous Stasis] : no venous stasis [Oriented To Time, Place, And Person] : oriented to person, place, and time [Skin Lesions] : no skin lesions [Affect] : the affect was normal [Impaired Insight] : insight and judgment were intact [Mood] : the mood was normal [No Anxiety] : not feeling anxious

## 2019-05-13 NOTE — REASON FOR VISIT
[Follow-Up - From Hospitalization] : follow-up of a recent hospitalization for [Supraventricular Tachycardia] : supraventricular tachycardia

## 2019-05-19 ENCOUNTER — APPOINTMENT (OUTPATIENT)
Dept: HEART AND VASCULAR | Facility: CLINIC | Age: 29
End: 2019-05-19
Payer: MEDICAID

## 2019-05-19 PROCEDURE — 93228 REMOTE 30 DAY ECG REV/REPORT: CPT

## 2019-06-05 NOTE — HISTORY OF PRESENT ILLNESS
[FreeTextEntry1] : 28 year old male with HTN, Anxiety and Sciatica admitted to [facility] 3/17/19 complaining of CP and SOB, found to be in an SVT that broke with adenosine s/p EP study and ablation of AVNRT. \par \par He was admitted 3/2019 with chest pain and SOB.  He was found to be in an SVT@ 198bpm, that broke with adenosine 6mg IV. He had similar episodes in the past but did not go to the ER.   He underwent an EP study with ablation of AVNRT.  He was discharged the following day.  Overall he is doing well.  He has palpitations different then what he felt before and last seconds.  he complains of fatigue.  No chest pain, SOB, orthopnea, PND or syncope.  \par  \par  \par

## 2019-06-05 NOTE — DISCUSSION/SUMMARY
[FreeTextEntry1] : 28 year old male with HTN, Anxiety and Sciatica admitted to [facility] 3/17/19 complaining of CP and SOB, found to be in an SVT that broke with adenosine s/p EP study and ablation of AVNRT. A non sustained atrial tachycardia was also induced.  Overall he is doing well. He complains of palpitations, but states that these are different then what he felt in the past and only last seconds.  I have asked him to wear an event monitor to get a better understanding of what this is.  His main complaint is fatigue and I have told him to cut his metoprolol and see if that improves his symptoms.  He will follow up in 2 months or sooner if needed.  He knows to call with any questions or concerns

## 2019-07-16 ENCOUNTER — APPOINTMENT (OUTPATIENT)
Dept: HEART AND VASCULAR | Facility: CLINIC | Age: 29
End: 2019-07-16
Payer: MEDICAID

## 2019-07-16 VITALS
WEIGHT: 165.99 LBS | SYSTOLIC BLOOD PRESSURE: 122 MMHG | HEART RATE: 70 BPM | DIASTOLIC BLOOD PRESSURE: 76 MMHG | OXYGEN SATURATION: 99 % | TEMPERATURE: 98.2 F | HEIGHT: 68 IN | BODY MASS INDEX: 25.16 KG/M2

## 2019-07-16 DIAGNOSIS — I47.1 SUPRAVENTRICULAR TACHYCARDIA: ICD-10-CM

## 2019-07-16 DIAGNOSIS — I10 ESSENTIAL (PRIMARY) HYPERTENSION: ICD-10-CM

## 2019-07-16 DIAGNOSIS — R00.2 PALPITATIONS: ICD-10-CM

## 2019-07-16 PROCEDURE — 99214 OFFICE O/P EST MOD 30 MIN: CPT

## 2019-07-16 PROCEDURE — 93000 ELECTROCARDIOGRAM COMPLETE: CPT

## 2019-07-16 RX ORDER — METOPROLOL TARTRATE 25 MG/1
25 TABLET, FILM COATED ORAL DAILY
Qty: 30 | Refills: 4 | Status: DISCONTINUED | COMMUNITY
Start: 2019-03-29 | End: 2019-07-16

## 2019-07-16 NOTE — HISTORY OF PRESENT ILLNESS
[FreeTextEntry1] : 28 M HTN AVNRT s/p ablation here for follow up feels ok did not tolerate metoprolol monitor done on metoprolol no events here today has occasional symptoms of what may be palpitations he is not sure \par \par \par ecg sinus arrhythmia

## 2019-07-16 NOTE — ASSESSMENT
[FreeTextEntry1] : avnrt metoprolol for three months then can stop\par palpitations will redo monitor\par htn controlled on amlodipine has a strong FH of htn no need for secondary HTN evaluation he is well controlled on one agent\par fu in january

## 2019-07-16 NOTE — PHYSICAL EXAM
[General Appearance - Well Developed] : well developed [Normal Appearance] : normal appearance [Well Groomed] : well groomed [General Appearance - Well Nourished] : well nourished [No Deformities] : no deformities [General Appearance - In No Acute Distress] : no acute distress [Normal Conjunctiva] : the conjunctiva exhibited no abnormalities [Eyelids - No Xanthelasma] : the eyelids demonstrated no xanthelasmas [Normal Oral Mucosa] : normal oral mucosa [No Oral Pallor] : no oral pallor [No Oral Cyanosis] : no oral cyanosis [Normal Jugular Venous A Waves Present] : normal jugular venous A waves present [Normal Jugular Venous V Waves Present] : normal jugular venous V waves present [No Jugular Venous Silverman A Waves] : no jugular venous islverman A waves [Respiration, Rhythm And Depth] : normal respiratory rhythm and effort [Exaggerated Use Of Accessory Muscles For Inspiration] : no accessory muscle use [Auscultation Breath Sounds / Voice Sounds] : lungs were clear to auscultation bilaterally [Heart Rate And Rhythm] : heart rate and rhythm were normal [Heart Sounds] : normal S1 and S2 [Murmurs] : no murmurs present [Abdomen Soft] : soft [Abdomen Tenderness] : non-tender [Abdomen Mass (___ Cm)] : no abdominal mass palpated [Abnormal Walk] : normal gait [Gait - Sufficient For Exercise Testing] : the gait was sufficient for exercise testing [Nail Clubbing] : no clubbing of the fingernails [Cyanosis, Localized] : no localized cyanosis [Petechial Hemorrhages (___cm)] : no petechial hemorrhages [Skin Color & Pigmentation] : normal skin color and pigmentation [] : no rash [No Venous Stasis] : no venous stasis [Skin Lesions] : no skin lesions [No Skin Ulcers] : no skin ulcer [No Xanthoma] : no  xanthoma was observed [Oriented To Time, Place, And Person] : oriented to person, place, and time [Affect] : the affect was normal [Mood] : the mood was normal [No Anxiety] : not feeling anxious

## 2019-09-18 NOTE — ED PROVIDER NOTE - SUBSTANCE USE COMMENT, PROFILE
Chart(s)/Home Health Aide or Other Non-Family Caregiver/Patient/Physician/Provider/Other Healthcare Facility
Denies

## 2019-10-03 ENCOUNTER — FORM ENCOUNTER (OUTPATIENT)
Age: 29
End: 2019-10-03

## 2019-10-04 ENCOUNTER — APPOINTMENT (OUTPATIENT)
Dept: ORTHOPEDIC SURGERY | Facility: CLINIC | Age: 29
End: 2019-10-04
Payer: MEDICAID

## 2019-10-04 ENCOUNTER — OUTPATIENT (OUTPATIENT)
Dept: OUTPATIENT SERVICES | Facility: HOSPITAL | Age: 29
LOS: 1 days | End: 2019-10-04
Payer: COMMERCIAL

## 2019-10-04 VITALS
HEART RATE: 99 BPM | WEIGHT: 170 LBS | BODY MASS INDEX: 25.18 KG/M2 | HEIGHT: 69 IN | OXYGEN SATURATION: 99 % | DIASTOLIC BLOOD PRESSURE: 70 MMHG | SYSTOLIC BLOOD PRESSURE: 130 MMHG

## 2019-10-04 DIAGNOSIS — M54.2 CERVICALGIA: ICD-10-CM

## 2019-10-04 DIAGNOSIS — M62.838 OTHER MUSCLE SPASM: ICD-10-CM

## 2019-10-04 PROCEDURE — 99204 OFFICE O/P NEW MOD 45 MIN: CPT

## 2019-10-04 PROCEDURE — 72050 X-RAY EXAM NECK SPINE 4/5VWS: CPT | Mod: 26

## 2019-10-04 PROCEDURE — 72050 X-RAY EXAM NECK SPINE 4/5VWS: CPT

## 2019-10-04 NOTE — DISCUSSION/SUMMARY
[Medication Risks Reviewed] : Medication risks reviewed [de-identified] : The patient is a 28 year old man with history of chronic low back pain who presents with a several day history of midline neck pain and spasm. He has no radicular symptoms today. His pain is likely mechanical in nature. The patient's symptoms are already improving with supportive care.\par \par Imaging was reviewed with the patient in detail.  All questions were answered appropriately.\par \par The patient was counseled on activity modification.  He may continue NSAIDs and muscle relaxant on an as needed basis.  He may continue heat therapy, and may consider a TENS unit.\par \par Patient was prescribed a course of physical therapy today.  The patient was also provided some general home exercises.  The patient was counseled on activity modification.\par \par Patient appreciates and agrees with current plan.\par \par This note was generated using dragon medical dictation software.  A reasonable effort has been made for proofreading its contents, but typos may still remain.  If there are any questions or points of clarification needed please notify my office.\par \par

## 2019-10-04 NOTE — HISTORY OF PRESENT ILLNESS
[5] : a current pain level of 5/10 [de-identified] : The patient is a 28 year old man with history of HTN who presents with neck pain.\par \par The patient is a 28-year-old, right-handed man, with history of chronic low back pain presents with a several day history of midline neck pain and spasm. He denies a specific injury or trauma. He states that he woke up one morning with severe neck stiffness. He denies constitutional symptoms. He denies radicular symptoms including weakness, numbness, paresthesias. He denies-like symptoms including gait instability, fever, chills, weight loss, night sweats. He has had previous pain in the past, and his pain today it is not as intense as previous episodes. He initially saw his primary care provider, who prescribed him a muscle relaxant and ibuprofen 800 mg to be taken as needed. He states that he feels much improved from several days ago.\par \par Pain is rated 5/10, described as throbbing, cramping, sharp, achy, improved with heat and ibuprofen, worse with neck movement.\par \par

## 2019-10-04 NOTE — PHYSICAL EXAM
[de-identified] : Cervical Spine:\par \par Inspection:\par ·	Alignment/Posture: No scoliosis or deformity. \par ·	Trapezius spasm noted, right greater than left\par ·	No scars\par \par Palpation:   \par ·	Midline:  NO pain\par ·	Paracervical:    MILD pain\par ·	Trapezius:   MILD pain\par ·	Levator Scapulae:  MILD ON RIGHT pain, TRIGGER POINT ON RIGHT\par ·	Rhomboids:  MILD BILATERAL pain\par \par \par ROM: \par ·	 Flexion:  30 degrees, without pain.\par ·	Extension:  30 degrees without pain  \par ·	Side Bendin degrees without pain \par ·	Rotation:  45 degrees without pain \par \par \par Strength: \par ·	5/5 Flexion, Extension, Sidebending, Rotation\par \par NEURO  \par ·	Sensation:   Intact throughout the upper and lower extremity\par ·	DTR's:  Symmetric throughout the upper and lower extremity.  \par \par Other tests: \par ·	Spurling's Maneuver: NEGATIVE \par \par Vascular:  \par ·	No cyanosis, clubbing, edema.  \par ·	Intact pulses distally\par \par \par \par \par  [de-identified] : Xray Cervical Spine - Multiple views were reviewed with the patient in detail.  There is no acute fracture.  There appears to be subtle intervertebral disc narrowing at multiple levels.  No instability on flexion/extension.  We will await formal radiology reading\par

## 2019-10-04 NOTE — REVIEW OF SYSTEMS
[Negative] : Heme/Lymph [Joint Pain] : joint pain [Anxiety] : anxiety [Feeling Tired] : fatigue [Muscle Weakness] : muscle weakness

## 2023-02-07 NOTE — ED ADULT TRIAGE NOTE - RESPIRATORY RATE (BREATHS/MIN)
20 Enbrel Counseling:  I discussed with the patient the risks of etanercept including but not limited to myelosuppression, immunosuppression, autoimmune hepatitis, demyelinating diseases, lymphoma, and infections.  The patient understands that monitoring is required including a PPD at baseline and must alert us or the primary physician if symptoms of infection or other concerning signs are noted.

## 2023-12-20 ENCOUNTER — NON-APPOINTMENT (OUTPATIENT)
Age: 33
End: 2023-12-20

## 2023-12-20 ENCOUNTER — APPOINTMENT (OUTPATIENT)
Dept: ORTHOPEDIC SURGERY | Facility: CLINIC | Age: 33
End: 2023-12-20
Payer: COMMERCIAL

## 2023-12-20 DIAGNOSIS — S46.002A UNSPECIFIED INJURY OF MUSCLE(S) AND TENDON(S) OF THE ROTATOR CUFF OF LEFT SHOULDER, INITIAL ENCOUNTER: ICD-10-CM

## 2023-12-20 PROCEDURE — 99203 OFFICE O/P NEW LOW 30 MIN: CPT

## 2023-12-20 NOTE — HISTORY OF PRESENT ILLNESS
[de-identified] : LOCATION: LEFT SHOULDER DURATION: 5 YRS CHRONIC CONTEXT: ATRAUMATIC- NO SPECIFIC INJURY INTERMITTENT PAIN LEVEL: 6/10 TODAY AND AT ITS WORSE A 12/10 TREATMENTS: IBUPROFEN AND HEATING PADS AGGRAVATING FACTORS: LIFTING THE ARM ASSOCIATED SYMPTOMS: SHARP  ASSOCIATED CLICKING/LOCKING/GRINDING SOMETIMES PHYSICAL THERAPY: NO CORTISONE:NO PRIOR STUDIES: XRAYS AT [facility]

## 2023-12-20 NOTE — DISCUSSION/SUMMARY
[de-identified] : 5 YEAR HISTORY OF LEFT SHOULDER PAIN NOW VERY WEAK - SOMETIMES CANNOT LIFT ARM  WORSE WHITH LIFTING AND AT NIGHT   XRAYS SUGGEST IMPINGEMENT SYNDROME

## 2023-12-20 NOTE — PHYSICAL EXAM
[de-identified] : PHYSICAL EXAM LEFT  SHOULDER - RHD   NECK EXAM  FROM NONTENDER  SPURLING  RIGHT=NEG, LEFT=NEG  MILD  SCAPULAR PROTRACTION AROM 130 / 130 / 90 / 20 TENDER: SA REGION ANTERIOR AND LATERAL   SPECIAL TESTING : GANT - POSITIVE  PAOLA - POSITIVE  SPEED TEST - POSITIVE  GORDON - NEGATIVE APPREHENSION AND SUPPRESSION - NEGATIVE   RC STRENGTH TESTING  SS:  5/5 SUB 5/5 IS     5/5 BICEPS  5/5  SENSATION  - GROSSLY INTACT   [de-identified] : 	 EXAM:  X-RAY LEFT SHOULDER MINIMUM 2 VIEWS  HISTORY:  Chronic left shoulder pain.  TECHNIQUE:  3 upright radiographic views of the left shoulder were obtained.   COMPARISON:  August 2017.   FINDINGS:    Osseous structures: There is no fracture or dislocation. Bone mineralization appears normal.  Joints: Glenohumeral joint space is preserved. The acromioclavicular joint space is preserved. There are no osteophytes or erosive changes.  Soft tissues: No soft tissue calcification or radiopaque foreign body.  The visualized left hemithorax is clear.  IMPRESSION: Unremarkable radiographs of the left shoulder.  Thank you for the opportunity to participate in the care of this patient.

## 2024-06-26 ENCOUNTER — APPOINTMENT (OUTPATIENT)
Dept: ORTHOPEDIC SURGERY | Facility: CLINIC | Age: 34
End: 2024-06-26

## 2024-06-26 DIAGNOSIS — M75.42 IMPINGEMENT SYNDROME OF LEFT SHOULDER: ICD-10-CM

## 2024-06-26 DIAGNOSIS — M75.112 INCOMPLETE ROTATOR CUFF TEAR OR RUPTURE OF LEFT SHOULDER, NOT SPECIFIED AS TRAUMATIC: ICD-10-CM

## 2024-06-26 PROCEDURE — 99213 OFFICE O/P EST LOW 20 MIN: CPT | Mod: 25

## 2024-06-26 PROCEDURE — 20611 DRAIN/INJ JOINT/BURSA W/US: CPT | Mod: LT

## 2024-08-13 ENCOUNTER — EMERGENCY (EMERGENCY)
Facility: HOSPITAL | Age: 34
LOS: 1 days | Discharge: HOME | End: 2024-08-13
Attending: EMERGENCY MEDICINE | Admitting: EMERGENCY MEDICINE
Payer: COMMERCIAL

## 2024-08-13 VITALS
DIASTOLIC BLOOD PRESSURE: 78 MMHG | SYSTOLIC BLOOD PRESSURE: 132 MMHG | TEMPERATURE: 98 F | HEART RATE: 76 BPM | OXYGEN SATURATION: 98 % | RESPIRATION RATE: 18 BRPM

## 2024-08-13 VITALS
TEMPERATURE: 99 F | SYSTOLIC BLOOD PRESSURE: 133 MMHG | HEART RATE: 89 BPM | OXYGEN SATURATION: 98 % | HEIGHT: 70 IN | DIASTOLIC BLOOD PRESSURE: 88 MMHG | RESPIRATION RATE: 16 BRPM | WEIGHT: 164.91 LBS

## 2024-08-13 DIAGNOSIS — R42 DIZZINESS AND GIDDINESS: ICD-10-CM

## 2024-08-13 DIAGNOSIS — I10 ESSENTIAL (PRIMARY) HYPERTENSION: ICD-10-CM

## 2024-08-13 DIAGNOSIS — F41.9 ANXIETY DISORDER, UNSPECIFIED: ICD-10-CM

## 2024-08-13 LAB
ANION GAP SERPL CALC-SCNC: 9 MMOL/L — SIGNIFICANT CHANGE UP (ref 5–17)
BASOPHILS # BLD AUTO: 0.04 K/UL — SIGNIFICANT CHANGE UP (ref 0–0.2)
BASOPHILS NFR BLD AUTO: 0.9 % — SIGNIFICANT CHANGE UP (ref 0–2)
BUN SERPL-MCNC: 11 MG/DL — SIGNIFICANT CHANGE UP (ref 7–23)
CALCIUM SERPL-MCNC: 9.8 MG/DL — SIGNIFICANT CHANGE UP (ref 8.4–10.5)
CHLORIDE SERPL-SCNC: 101 MMOL/L — SIGNIFICANT CHANGE UP (ref 96–108)
CO2 SERPL-SCNC: 28 MMOL/L — SIGNIFICANT CHANGE UP (ref 22–31)
CREAT SERPL-MCNC: 0.97 MG/DL — SIGNIFICANT CHANGE UP (ref 0.5–1.3)
DACRYOCYTES BLD QL SMEAR: SLIGHT — SIGNIFICANT CHANGE UP
EGFR: 105 ML/MIN/1.73M2 — SIGNIFICANT CHANGE UP
EOSINOPHIL # BLD AUTO: 0.04 K/UL — SIGNIFICANT CHANGE UP (ref 0–0.5)
EOSINOPHIL NFR BLD AUTO: 0.9 % — SIGNIFICANT CHANGE UP (ref 0–6)
GLUCOSE SERPL-MCNC: 88 MG/DL — SIGNIFICANT CHANGE UP (ref 70–99)
HCT VFR BLD CALC: 45 % — SIGNIFICANT CHANGE UP (ref 39–50)
HGB BLD-MCNC: 14.7 G/DL — SIGNIFICANT CHANGE UP (ref 13–17)
LYMPHOCYTES # BLD AUTO: 1.35 K/UL — SIGNIFICANT CHANGE UP (ref 1–3.3)
LYMPHOCYTES # BLD AUTO: 27.4 % — SIGNIFICANT CHANGE UP (ref 13–44)
MANUAL SMEAR VERIFICATION: SIGNIFICANT CHANGE UP
MCHC RBC-ENTMCNC: 27.6 PG — SIGNIFICANT CHANGE UP (ref 27–34)
MCHC RBC-ENTMCNC: 32.7 GM/DL — SIGNIFICANT CHANGE UP (ref 32–36)
MCV RBC AUTO: 84.6 FL — SIGNIFICANT CHANGE UP (ref 80–100)
MONOCYTES # BLD AUTO: 0.31 K/UL — SIGNIFICANT CHANGE UP (ref 0–0.9)
MONOCYTES NFR BLD AUTO: 6.2 % — SIGNIFICANT CHANGE UP (ref 2–14)
NEUTROPHILS # BLD AUTO: 3.13 K/UL — SIGNIFICANT CHANGE UP (ref 1.8–7.4)
NEUTROPHILS NFR BLD AUTO: 63.7 % — SIGNIFICANT CHANGE UP (ref 43–77)
OVALOCYTES BLD QL SMEAR: SLIGHT — SIGNIFICANT CHANGE UP
PLAT MORPH BLD: ABNORMAL
PLATELET # BLD AUTO: 230 K/UL — SIGNIFICANT CHANGE UP (ref 150–400)
POIKILOCYTOSIS BLD QL AUTO: SLIGHT — SIGNIFICANT CHANGE UP
POLYCHROMASIA BLD QL SMEAR: SLIGHT — SIGNIFICANT CHANGE UP
POTASSIUM SERPL-MCNC: 4.4 MMOL/L — SIGNIFICANT CHANGE UP (ref 3.5–5.3)
POTASSIUM SERPL-SCNC: 4.4 MMOL/L — SIGNIFICANT CHANGE UP (ref 3.5–5.3)
RBC # BLD: 5.32 M/UL — SIGNIFICANT CHANGE UP (ref 4.2–5.8)
RBC # FLD: 12.2 % — SIGNIFICANT CHANGE UP (ref 10.3–14.5)
RBC BLD AUTO: ABNORMAL
SODIUM SERPL-SCNC: 138 MMOL/L — SIGNIFICANT CHANGE UP (ref 135–145)
VARIANT LYMPHS # BLD: 0.9 % — SIGNIFICANT CHANGE UP (ref 0–6)
WBC # BLD: 4.92 K/UL — SIGNIFICANT CHANGE UP (ref 3.8–10.5)
WBC # FLD AUTO: 4.92 K/UL — SIGNIFICANT CHANGE UP (ref 3.8–10.5)

## 2024-08-13 PROCEDURE — 93005 ELECTROCARDIOGRAM TRACING: CPT

## 2024-08-13 PROCEDURE — 70496 CT ANGIOGRAPHY HEAD: CPT | Mod: MC

## 2024-08-13 PROCEDURE — 99285 EMERGENCY DEPT VISIT HI MDM: CPT | Mod: 25

## 2024-08-13 PROCEDURE — 82962 GLUCOSE BLOOD TEST: CPT

## 2024-08-13 PROCEDURE — 70498 CT ANGIOGRAPHY NECK: CPT | Mod: MC

## 2024-08-13 PROCEDURE — 99285 EMERGENCY DEPT VISIT HI MDM: CPT

## 2024-08-13 PROCEDURE — 85025 COMPLETE CBC W/AUTO DIFF WBC: CPT

## 2024-08-13 PROCEDURE — 80048 BASIC METABOLIC PNL TOTAL CA: CPT

## 2024-08-13 PROCEDURE — 36415 COLL VENOUS BLD VENIPUNCTURE: CPT

## 2024-08-13 PROCEDURE — 96360 HYDRATION IV INFUSION INIT: CPT | Mod: XU

## 2024-08-13 PROCEDURE — 70496 CT ANGIOGRAPHY HEAD: CPT | Mod: 26,MC

## 2024-08-13 PROCEDURE — 70450 CT HEAD/BRAIN W/O DYE: CPT | Mod: MC

## 2024-08-13 PROCEDURE — 70498 CT ANGIOGRAPHY NECK: CPT | Mod: 26,MC

## 2024-08-13 PROCEDURE — 93010 ELECTROCARDIOGRAM REPORT: CPT

## 2024-08-13 PROCEDURE — 96361 HYDRATE IV INFUSION ADD-ON: CPT

## 2024-08-13 RX ORDER — MECLIZINE 12.5 MG/1
25 TABLET ORAL ONCE
Refills: 0 | Status: COMPLETED | OUTPATIENT
Start: 2024-08-13 | End: 2024-08-13

## 2024-08-13 RX ORDER — MECLIZINE 12.5 MG/1
1 TABLET ORAL
Qty: 15 | Refills: 0
Start: 2024-08-13 | End: 2024-08-17

## 2024-08-13 RX ORDER — BACTERIOSTATIC SODIUM CHLORIDE 0.9 %
1000 VIAL (ML) INJECTION ONCE
Refills: 0 | Status: COMPLETED | OUTPATIENT
Start: 2024-08-13 | End: 2024-08-13

## 2024-08-13 RX ADMIN — MECLIZINE 25 MILLIGRAM(S): 12.5 TABLET ORAL at 11:13

## 2024-08-13 RX ADMIN — Medication 1000 MILLILITER(S): at 11:12

## 2024-08-13 RX ADMIN — Medication 1000 MILLILITER(S): at 13:47

## 2024-08-13 NOTE — ED PROVIDER NOTE - CARE PROVIDER_API CALL
Imani Hernandez  Neurology  [address]  [place] [address]  Phone: [phone]  Fax: [phone]  Follow Up Time:     Francisca Pratt  Otolaryngology  [address]  [place] [address]  Phone: [phone]  Fax: [phone]  Follow Up Time:

## 2024-08-13 NOTE — ED PROVIDER NOTE - PATIENT PORTAL LINK FT
You can access the FollowMyHealth Patient Portal offered by [facility] by registering at the following website: [email or web address]. By joining [email or web address] FollowMyHealth portal, you will also be able to view your health information using other applications (apps) compatible with our system.

## 2024-08-13 NOTE — ED PROVIDER NOTE - CLINICAL SUMMARY MEDICAL DECISION MAKING FREE TEXT BOX
32 yo M pmh of HTN, AVNRT s/p ablation 2019, chronic neck and back pain, sciatica, anxiety c/o loss of balance x 1 week. Pt states that he feels like his equilibrium is off. Pt states when he stands he feels like he is swaying. Denies HA, dizziness, room spinning, changes in vision, n/v, focal weakness, cp, sob, fever, chills, earache, tinnitus. VSS. Well appearing. +brief L lateral nystagmus. Neuro intact. 32 yo M pmh of HTN, AVNRT s/p ablation 2019, chronic neck and back pain, sciatica, anxiety c/o loss of balance x 1 week. Pt states that he feels like his equilibrium is off. Pt states when he stands he feels like he is swaying. Denies HA, dizziness, room spinning, changes in vision, n/v, focal weakness, cp, sob, fever, chills, earache, tinnitus. VSS. Well appearing. +brief L lateral nystagmus. Neuro intact. CTH, CTA h/n neg. Pt  feeling some improvement with meclizine. ?BPPV

## 2024-08-13 NOTE — ED ADULT NURSE NOTE - OBJECTIVE STATEMENT
PMH HTN, sciatica. Patient c/o of feeling unsteady gait X 1 week, mild dizziness, no weakness, no vision changes, does not feel room spinning, no headache.  States saw an NP last week and neuro assessments WNL.  Patient states just does not feel right, has a feeling of "sinking into the ground".  Alsoc/o of chronic left neck/shoulder and back pain.  PMH HTN, sciatica.

## 2024-08-13 NOTE — ED ADULT NURSE REASSESSMENT NOTE - NS ED NURSE REASSESS COMMENT FT1
Symptoms/dizziness improved s/p Meclizine 25mg PO.  No new symptom complaint.  Vital signs stble.  All labs, tests, CT scan resulted.  Discharged to home in stable condition.

## 2024-08-13 NOTE — ED PROVIDER NOTE - OBJECTIVE STATEMENT
32 yo M pmh of HTN, AVNRT s/p ablation 2019, chronic neck and back pain, sciatica, anxiety c/o loss of balance x 1 week. Pt states that he feels like his equilibrium is off. Pt states when he stands he feels like he is swaying and he is falling forward but he isnt actually falling. Denies HA, dizziness, room spinning, changes in vision, n/v, focal weakness, cp, sob, fever, chills, earache, tinnitus.

## 2024-08-13 NOTE — ED ADULT NURSE REASSESSMENT NOTE - NS ED NURSE REASSESS COMMENT FT1
Patient aoX3, anxious, c/o of unsteady gait, able to ambulate on own w/out falling or swaying, no chest pain, no SOB, no neuro deficits.  Vital signs stable. Left AC PIV #20 in place, all labs sent,no complications. Meclizine 25mg PO, NSS 1 L bolus adminsitered.  CT scan pending.

## 2024-08-13 NOTE — ED ADULT NURSE NOTE - NSFALLUNIVINTERV_ED_ALL_ED
Bed/Stretcher in lowest position, wheels locked, appropriate side rails in place/Call bell, personal items and telephone in reach/Instruct patient to call for assistance before getting out of bed/chair/stretcher/Non-slip footwear applied when patient is off stretcher/[place] to call system/Physically safe environment - no spills, clutter or unnecessary equipment/Purposeful proactive rounding/Room/bathroom lighting operational, light cord in reach

## 2024-08-13 NOTE — ED PROVIDER NOTE - ATTENDING APP SHARED VISIT CONTRIBUTION OF CARE
33M with pmh of HTN, AVNRT s/p ablation 2019, chronic neck and back pain, sciatica, anxiety c/o loss of balance x 1 week, descibed as feeling soledad his equilibrium is off. No headache, vision or speech change, changes in vision, n/v, focal weakness, cp, sob, fever, chills, earache, tinnitus. VSS. Well appearing. +brief L lateral nystagmus. No focal neuro deficits. Plan for labs, meclizine and CTH, CTA h/n. Imaging unremarkable, Pt  feeling better after meclizine. suspect peripheral vertigo, Pt feeling improved and is stable for DC. ED evaluation and management discussed with the patient in detail.  Close PMD/ENT follow up encouraged.  Strict ED return instructions discussed in detail and patient given the opportunity to ask any questions about their discharge diagnosis and instructions. Patient verbalized understanding.

## 2024-08-13 NOTE — ED ADULT TRIAGE NOTE - CHIEF COMPLAINT QUOTE
pt aaox3 c/o feeling "like I'm constantly leaning forward, like I'm going to topple over" for 1x wk. Pt started on amlodipine on 7/8 by PCP. No focal weakness on exam. Denies any numbness, SOB, CP, dizziness, HA, vision changes.

## 2024-08-13 NOTE — ED PROVIDER NOTE - NSFOLLOWUPINSTRUCTIONS_ED_ALL_ED_FT
Take meclizine as needed for dizziness and feelin off balance     Dizziness    Dizziness can manifest as a feeling of unsteadiness or light-headedness. You may feel like you are about to faint. This condition can be caused by a number of things, including medicines, dehydration, or illness. Drink enough fluid to keep your urine clear or pale yellow. Do not drink alcohol and limit your caffeine intake. Avoid quick or sudden movements.  Rise slowly from chairs and steady yourself until you feel okay. In the morning, first sit up on the side of the bed.    SEEK IMMEDIATE MEDICAL CARE IF YOU HAVE ANY OF THE FOLLOWING SYMPTOMS: vomiting, changes in your vision or speech, weakness in your arms or legs, trouble speaking or swallowing, chest pain, abdominal pain, shortness of breath, sweating, bleeding, headache, neck pain, or fever.    Vertigo    WHAT YOU NEED TO KNOW:    Vertigo is a condition that causes you to feel dizzy. You may feel that you or everything around you is moving or spinning. You may also feel like you are being pulled down or toward your side.     DISCHARGE INSTRUCTIONS:    Return to the emergency department if:     You have a headache and a stiff neck.      You have shaking chills and a fever.       You vomit over and over with no relief.       You have blood, pus, or fluid coming out of your ears.      You are confused.     Contact your healthcare provider if:     Your symptoms do not get better with treatment.       You have questions about your condition or care.    Medicines:     Medicine may be given to help relieve your symptoms.      Take your medicine as directed. Contact your healthcare provider if you think your medicine is not helping or if you have side effects. Tell him or her if you are allergic to any medicine. Keep a list of the medicines, vitamins, and herbs you take. Include the amounts, and when and why you take them. Bring the list or the pill bottles to follow-up visits. Carry your medicine list with you in case of an emergency.    Manage your symptoms:     Do not drive, walk without help, or operate heavy machinery when you are dizzy.       Move slowly when you move from one position to another position. Get up slowly from sitting or lying down. Sit or lie down right away if you feel dizzy.      Drink plenty of liquids. Liquids help prevent dehydration. Ask how much liquid to drink each day and which liquids are best for you.      Vestibular and balance rehabilitation therapy (VBRT) is used to teach you exercises to improve your balance and strength. These exercises may help decrease your vertigo and improve your balance. Ask for more information about this therapy.    Follow up with your healthcare provider as directed: Write down your questions so you remember to ask them during your visits.

## 2024-08-13 NOTE — ED PROVIDER NOTE - PHYSICAL EXAMINATION
CONSTITUTIONAL: Well-appearing;  in no apparent distress.   HEAD: Normocephalic; atraumatic.   EYES: PERRL; EOM intact; conjunctiva and sclera clear; brief L lateral nystagmus   ENT: normal nose; no rhinorrhea; normal pharynx with no erythema or lesions.   NECK: Supple; non-tender; no LAD  CARDIOVASCULAR: Normal S1, S2; No audible murmurs. Regular rate and rhythm.   RESPIRATORY: Breathing easily; breath sounds clear and equal bilaterally; no wheezes, rhonchi, or rales.  GI: Soft; non-distended; non-tender; no palpable organomegaly.   MSK: FROM at all extremities, normal tone   EXT: No cyanosis or edema; N/V intact  SKIN: Normal for age and race; warm; dry; good turgor; no apparent lesions or rash.   NEURO: AAO x 3, CN II-XII intact, normal speech, strength 5/5 bilateral upper and lower extremities, sensation intact, cerebellum intact, no ataxia, follows commands appropriately   PSYCHOLOGICAL: The patient’s mood and manner are appropriate.

## 2024-08-13 NOTE — ED PROVIDER NOTE - CARE PROVIDERS DIRECT ADDRESSES
[email or web address]

## 2024-08-13 NOTE — ED ADULT NURSE REASSESSMENT NOTE - NS ED NURSE REASSESS COMMENT FT1
Patient a/oX3, able to ambulate w/ steady gait, no neuro deficits, no new symptom complaint.  Vital signs stble.  CT scan done; results and disposition pending.

## 2024-08-14 ENCOUNTER — NON-APPOINTMENT (OUTPATIENT)
Age: 34
End: 2024-08-14

## 2024-08-16 ENCOUNTER — NON-APPOINTMENT (OUTPATIENT)
Age: 34
End: 2024-08-16

## 2024-08-21 ENCOUNTER — APPOINTMENT (OUTPATIENT)
Dept: NEUROLOGY | Facility: CLINIC | Age: 34
End: 2024-08-21
Payer: MEDICAID

## 2024-08-21 VITALS
BODY MASS INDEX: 23.99 KG/M2 | HEART RATE: 78 BPM | WEIGHT: 162 LBS | HEIGHT: 69 IN | DIASTOLIC BLOOD PRESSURE: 82 MMHG | TEMPERATURE: 97.1 F | SYSTOLIC BLOOD PRESSURE: 138 MMHG | OXYGEN SATURATION: 99 %

## 2024-08-21 DIAGNOSIS — R42 DIZZINESS AND GIDDINESS: ICD-10-CM

## 2024-08-21 DIAGNOSIS — M54.31 SCIATICA, RIGHT SIDE: ICD-10-CM

## 2024-08-21 DIAGNOSIS — M54.32 SCIATICA, RIGHT SIDE: ICD-10-CM

## 2024-08-21 PROCEDURE — G2211 COMPLEX E/M VISIT ADD ON: CPT | Mod: NC

## 2024-08-21 PROCEDURE — 99205 OFFICE O/P NEW HI 60 MIN: CPT

## 2024-08-21 RX ORDER — MECLIZINE HYDROCHLORIDE 12.5 MG/1
12.5 TABLET ORAL
Qty: 28 | Refills: 1 | Status: ACTIVE | COMMUNITY
Start: 2024-08-21 | End: 1900-01-01

## 2024-09-04 NOTE — ED ADULT NURSE NOTE - NSFALLRSKASSESSDT_ED_ALL_ED
Prep Survey      Flowsheet Row Responses   [facility] patient discharged from? [facility]   Is LACE score < 7 ? No   Eligibility [facility]   Date of Admission 08/27/24   Date of Discharge 09/03/24   Discharge Disposition Home or Self Care   Discharge diagnosis Hepatic encephalopathy  Fluid retention   Does the patient have one of the following disease processes/diagnoses(primary or secondary)? Other   Does the patient have Home health ordered? No   Is there a DME ordered? Yes   What DME was ordered? 3 prong cane and bariatric bsc have been ordered from [facility]   Prep survey completed? Yes            Helen QUINN - Registered Nurse           17-Mar-2019 19:30

## 2024-09-16 ENCOUNTER — APPOINTMENT (OUTPATIENT)
Dept: MRI IMAGING | Facility: CLINIC | Age: 34
End: 2024-09-16

## 2024-09-19 ENCOUNTER — RESULT REVIEW (OUTPATIENT)
Age: 34
End: 2024-09-19

## 2024-09-19 ENCOUNTER — APPOINTMENT (OUTPATIENT)
Dept: OTOLARYNGOLOGY | Facility: CLINIC | Age: 34
End: 2024-09-19
Payer: COMMERCIAL

## 2024-09-19 VITALS
HEIGHT: 69 IN | WEIGHT: 163 LBS | DIASTOLIC BLOOD PRESSURE: 82 MMHG | TEMPERATURE: 97 F | BODY MASS INDEX: 24.14 KG/M2 | SYSTOLIC BLOOD PRESSURE: 130 MMHG | HEART RATE: 72 BPM

## 2024-09-19 PROCEDURE — 92567 TYMPANOMETRY: CPT

## 2024-09-19 PROCEDURE — 99203 OFFICE O/P NEW LOW 30 MIN: CPT

## 2024-09-19 PROCEDURE — 92557 COMPREHENSIVE HEARING TEST: CPT

## 2024-09-19 RX ORDER — METOPROLOL TARTRATE 25 MG/1
25 TABLET, FILM COATED ORAL TWICE DAILY
Refills: 0 | Status: ACTIVE | COMMUNITY

## 2024-09-19 NOTE — HISTORY OF PRESENT ILLNESS
[de-identified] : Mr. QUIROZ is a 33-year-old man who was referred by [facility] ED for imbalance. PMH: HTN, AVNRT s/p ablation 2019, chronic neck and back pain, sciatica, anxiety   On 8/13/2024, he presented to [facility] ED c/o loss of balance x 1 week. Pt stated that he felt like his equilibrium was off. When he stands, he feels like he is swaying and is falling forward but he isn't actually falling.   Denies HA, dizziness, room spinning, changes in vision, n/v, focal weakness, ear pain, tinnitus and HL. CT head and CTA brain/neck were unremarkable.  He felt better after given meclizine 25 mg in ED. On 8/21/2024, he was evaluated by Dr. Callahan who diagnosed peripheral source of his imbalance.  MRI IAC ordered, but the 9/16 appt was cancelled because authorization was denied by insurance. For past 2 weeks, overall improved but still feels swaying.  Taking 12.5 mg meclizine once a day but not regularly. He gets occasional stuffy nose when he wakes up; resolves after he is up for a while.  Hx of seasonal allergies, with sx worse in childhood.  Does not need to take allergy meds. Works as  for retail.   CT HEAD, CT ANGIO BRAIN/NECK with and without contrast (8/13/2024) * CT HEAD: - There is no acute intracranial mass-effect, hemorrhage, midline shift, or abnormal extra-axial fluid collection. - Ventricles, sulci, and cisterns are normal in size for the patient's age without hydrocephalus. Basal cisterns are patent. - Scattered ethmoid sinus mucosal thickening is present. Remaining paranasal sinuses and mastoid air cells are clear. - Calvarium is intact. *CTA BRAIN: - INTERNAL CAROTID ARTERIES: Patent without hemodynamically significant stenosis, occlusion, or aneurysm. The ICA bifurcations are unremarkable. - ANTERIOR CEREBRAL ARTERIES: Patent without flow-limiting stenosis or occlusion. Anterior communicating artery is unremarkable without aneurysm. - MIDDLE CEREBRAL ARTERIES: Patent without flow-limiting stenosis or occlusion. MCA bifurcations are unremarkable without aneurysm. - POSTERIOR CEREBRAL ARTERIES: Patent without flow-limiting stenosis or occlusion. Posterior communicating arteries are not well seen bilaterally. - VERTEBROBASILAR SYSTEM: Patent without flow-limiting stenosis or occlusion. *CTA NECK: - RIGHT CAROTID SYSTEM: Normal in course and caliber without flow-limiting stenosis or occlusion. - LEFT CAROTID SYSTEM: Normal in course and caliber without flow-limiting stenosis or occlusion. - VERTEBRAL SYSTEM: Patent. No flow-limiting stenosis or occlusion. Direct origin of the left vertebral artery from the aortic arch. Diminutive nondominant left vertebral artery system. - AORTIC ARCH: Direct left vertebral origin from the arch. IMPRESSION: CT HEAD: No acute intracranial bleeding. CTA BRAIN: Patent intracranial circulation. No flow-limiting stenosis or occlusion. CTA NECK: Patent cervical vasculature. No flow limiting stenosis or occlusion. Direct origin of the left vertebral artery from the aortic arch, normal anatomic variant.

## 2024-09-19 NOTE — DATA REVIEWED
[de-identified] :   AUDIOGRAM (09/19/2024) RIGHT:  Hearing within normal limits.     SNHL LEFT:   Hearing within normal limits.     SNHL Tympanograms A  bilateral     right;    left Word recognition 100% bilateral  on  right;  % on left OAEs:  present            absent         Right        Left

## 2024-09-19 NOTE — REVIEW OF SYSTEMS
[Patient Intake Form Reviewed] : Patient intake form was reviewed [Seasonal Allergies] : seasonal allergies [As Noted in HPI] : as noted in HPI [Negative] : Heme/Lymph [FreeTextEntry9] : back pain, arthritis

## 2024-09-19 NOTE — PHYSICAL EXAM
[FreeTextEntry1] : No hoarseness.  [] : Tandem Romberg test is negative [Normal] : no rashes [de-identified] : wears glasses [de-identified] : Carotid pulses 2+ bilateral.  [de-identified] : Fukuda step test nonlateralizing.

## 2024-09-25 ENCOUNTER — APPOINTMENT (OUTPATIENT)
Dept: MRI IMAGING | Facility: CLINIC | Age: 34
End: 2024-09-25
Payer: COMMERCIAL

## 2024-09-25 PROCEDURE — A9585: CPT

## 2024-09-25 PROCEDURE — 70553 MRI BRAIN STEM W/O & W/DYE: CPT

## 2025-01-21 PROBLEM — J30.2 SEASONAL ALLERGIES: Status: RESOLVED | Noted: 2025-01-21 | Resolved: 2025-01-21

## 2025-01-21 PROBLEM — R09.81 NASAL CONGESTION: Status: ACTIVE | Noted: 2025-01-21

## 2025-03-20 ENCOUNTER — APPOINTMENT (OUTPATIENT)
Dept: ORTHOPEDIC SURGERY | Facility: CLINIC | Age: 35
End: 2025-03-20

## 2025-03-20 DIAGNOSIS — M75.112 INCOMPLETE ROTATOR CUFF TEAR OR RUPTURE OF LEFT SHOULDER, NOT SPECIFIED AS TRAUMATIC: ICD-10-CM

## 2025-03-20 PROCEDURE — 20611 DRAIN/INJ JOINT/BURSA W/US: CPT | Mod: LT

## 2025-03-20 PROCEDURE — 99214 OFFICE O/P EST MOD 30 MIN: CPT | Mod: 25

## 2025-03-20 RX ORDER — IBUPROFEN 800 MG/1
800 TABLET ORAL 3 TIMES DAILY
Qty: 90 | Refills: 3 | Status: ACTIVE | COMMUNITY
Start: 2025-03-20 | End: 1900-01-01